# Patient Record
Sex: MALE | Race: BLACK OR AFRICAN AMERICAN | URBAN - METROPOLITAN AREA
[De-identification: names, ages, dates, MRNs, and addresses within clinical notes are randomized per-mention and may not be internally consistent; named-entity substitution may affect disease eponyms.]

---

## 2024-03-11 ENCOUNTER — NURSING HOME VISIT (OUTPATIENT)
Dept: POST ACUTE CARE | Facility: EXTERNAL LOCATION | Age: 64
End: 2024-03-11
Payer: MEDICARE

## 2024-03-11 DIAGNOSIS — R53.1 WEAKNESS: ICD-10-CM

## 2024-03-11 DIAGNOSIS — K21.9 GASTROESOPHAGEAL REFLUX DISEASE, UNSPECIFIED WHETHER ESOPHAGITIS PRESENT: ICD-10-CM

## 2024-03-11 DIAGNOSIS — F20.0 PARANOID SCHIZOPHRENIA (MULTI): ICD-10-CM

## 2024-03-11 DIAGNOSIS — R56.9 SEIZURES (MULTI): ICD-10-CM

## 2024-03-11 DIAGNOSIS — F03.90 DEMENTIA, UNSPECIFIED DEMENTIA SEVERITY, UNSPECIFIED DEMENTIA TYPE, UNSPECIFIED WHETHER BEHAVIORAL, PSYCHOTIC, OR MOOD DISTURBANCE OR ANXIETY (MULTI): ICD-10-CM

## 2024-03-11 PROBLEM — R44.0 AUDITORY HALLUCINATIONS: Status: ACTIVE | Noted: 2024-03-11

## 2024-03-11 PROBLEM — E78.5 BORDERLINE HYPERLIPIDEMIA: Status: ACTIVE | Noted: 2024-03-11

## 2024-03-11 PROBLEM — F20.9 SCHIZOPHRENIA (MULTI): Status: ACTIVE | Noted: 2024-03-11

## 2024-03-11 PROBLEM — R44.1 VISUAL HALLUCINATIONS: Status: ACTIVE | Noted: 2024-03-11

## 2024-03-11 PROBLEM — E55.9 VITAMIN D DEFICIENCY: Status: ACTIVE | Noted: 2024-03-11

## 2024-03-11 PROCEDURE — 99308 SBSQ NF CARE LOW MDM 20: CPT | Performed by: NURSE PRACTITIONER

## 2024-03-11 NOTE — PROGRESS NOTES
PROGRESS NOTE    Subjective   Chief complaint: Adalberto Damian is a 64 y.o. male who is an acute skilled patient being seen and evaluated for weakness    HPI:  Patient admitted to SNF for therapy d/t weakness after recent hospitalization.  Patient requires assist with ADLs and transfers. He has been evaluated by therapies and will be working with PT\OT\ST.  No new complaints.        Objective   Vital signs: 148/87,102,97%    Physical Exam  Constitutional:       General: He is not in acute distress.  Eyes:      Extraocular Movements: Extraocular movements intact.   Cardiovascular:      Rate and Rhythm: Normal rate and regular rhythm.   Pulmonary:      Effort: Pulmonary effort is normal.      Breath sounds: Normal breath sounds.   Abdominal:      General: Bowel sounds are normal.      Palpations: Abdomen is soft.   Musculoskeletal:      Cervical back: Neck supple.      Right lower leg: Edema present.      Left lower leg: Edema present.   Neurological:      Mental Status: He is alert.   Psychiatric:         Mood and Affect: Mood normal.         Behavior: Behavior is cooperative.         Assessment/Plan   Problem List Items Addressed This Visit       Gastroesophageal reflux disease     Omeprazole  Symptoms controlled  Monitor          Paranoid schizophrenia (CMS/[facility])     Risperidone  Controlled, monitor for changes          Dementia (CMS/[facility])     Memantine  Mentation at baseline  Monitor for changes          Seizures (CMS/[facility])     Depakote  Topiramate  Denies seizure activity  Monitor          Weakness     Continue working with therapy           Medications, treatments, and labs reviewed  Continue medications and treatments as listed in EMR    Scribe Attestation  I, Josefina Schaeffer   attest that this documentation has been prepared under the direction and in the presence of TALI Sylvester.     Provider Attestation - Scribe documentation  All medical record entries made by the Scribe were at my  direction and personally dictated by me. I have reviewed the chart and agree that the record accurately reflects my personal performance of the history, physical exam, discussion and plan.   Arely Streeter, APRN-CNP

## 2024-03-11 NOTE — LETTER
Patient: Adalberto Damian  : 1960    Encounter Date: 2024    PROGRESS NOTE    Subjective  Chief complaint: Adalberto Damian is a 64 y.o. male who is an acute skilled patient being seen and evaluated for weakness    HPI:  Patient admitted to SNF for therapy d/t weakness after recent hospitalization.  Patient requires assist with ADLs and transfers. He has been evaluated by therapies and will be working with PT\OT\ST.  No new complaints.        Objective  Vital signs: 148/87,102,97%    Physical Exam  Constitutional:       General: He is not in acute distress.  Eyes:      Extraocular Movements: Extraocular movements intact.   Cardiovascular:      Rate and Rhythm: Normal rate and regular rhythm.   Pulmonary:      Effort: Pulmonary effort is normal.      Breath sounds: Normal breath sounds.   Abdominal:      General: Bowel sounds are normal.      Palpations: Abdomen is soft.   Musculoskeletal:      Cervical back: Neck supple.      Right lower leg: Edema present.      Left lower leg: Edema present.   Neurological:      Mental Status: He is alert.   Psychiatric:         Mood and Affect: Mood normal.         Behavior: Behavior is cooperative.         Assessment/Plan  Problem List Items Addressed This Visit       Gastroesophageal reflux disease     Omeprazole  Symptoms controlled  Monitor          Paranoid schizophrenia (CMS/HCC)     Risperidone  Controlled, monitor for changes          Dementia (CMS/HCC)     Memantine  Mentation at baseline  Monitor for changes          Seizures (CMS/HCC)     Depakote  Topiramate  Denies seizure activity  Monitor          Weakness     Continue working with therapy           Medications, treatments, and labs reviewed  Continue medications and treatments as listed in EMR    Scribe Attestation  I, Josefina Schaeffer   attest that this documentation has been prepared under the direction and in the presence of TALI Sylvester.     Provider Attestation - Scribe  documentation  All medical record entries made by the Scribe were at my direction and personally dictated by me. I have reviewed the chart and agree that the record accurately reflects my personal performance of the history, physical exam, discussion and plan.   TALI Sylvester      Electronically Signed By: TALI Sylvester   3/17/24  2:48 PM

## 2024-03-22 ENCOUNTER — NURSING HOME VISIT (OUTPATIENT)
Dept: POST ACUTE CARE | Facility: EXTERNAL LOCATION | Age: 64
End: 2024-03-22
Payer: MEDICARE

## 2024-03-22 DIAGNOSIS — F03.90 DEMENTIA, UNSPECIFIED DEMENTIA SEVERITY, UNSPECIFIED DEMENTIA TYPE, UNSPECIFIED WHETHER BEHAVIORAL, PSYCHOTIC, OR MOOD DISTURBANCE OR ANXIETY (MULTI): ICD-10-CM

## 2024-03-22 DIAGNOSIS — F20.9 SCHIZOPHRENIA, UNSPECIFIED TYPE (MULTI): ICD-10-CM

## 2024-03-22 DIAGNOSIS — N39.0 URINARY TRACT INFECTION WITHOUT HEMATURIA, SITE UNSPECIFIED: ICD-10-CM

## 2024-03-22 DIAGNOSIS — N17.9 AKI (ACUTE KIDNEY INJURY) (CMS-HCC): ICD-10-CM

## 2024-03-22 DIAGNOSIS — E78.5 BORDERLINE HYPERLIPIDEMIA: ICD-10-CM

## 2024-03-22 DIAGNOSIS — I10 HYPERTENSION, UNSPECIFIED TYPE: ICD-10-CM

## 2024-03-22 DIAGNOSIS — K21.9 GASTROESOPHAGEAL REFLUX DISEASE, UNSPECIFIED WHETHER ESOPHAGITIS PRESENT: ICD-10-CM

## 2024-03-22 DIAGNOSIS — T82.41XD: Primary | ICD-10-CM

## 2024-03-22 PROBLEM — R13.10 DYSPHAGIA: Status: ACTIVE | Noted: 2024-03-22

## 2024-03-22 PROBLEM — T82.41XA: Status: ACTIVE | Noted: 2024-03-22

## 2024-03-22 PROCEDURE — 99305 1ST NF CARE MODERATE MDM 35: CPT | Performed by: INTERNAL MEDICINE

## 2024-03-22 NOTE — PROGRESS NOTES
HISTORY & PHYSICAL    Subjective   Chief complaint: Adalberto Damian is a 64 y.o. male who is being seen and evaluated for multiple medical problems.      HPI:  HPI  Patient had a previous admission in March with rhabdomyolysis and JEREMY, requiring HD. Patient had presented back to the hospital with permanent catheter malfunction.  Patient reported to have fevers and source unclear, urine culture on 3/15/2024, UTI, difficult to evaluate UA as on HD.  Patient reported to have sterile blood cultures.  Patient had recent COVID in February 2024 and was swabbed again and negative.  Patient is dialysis catheter was exchanged.  Patient tolerating dialysis without issue.  Patient was hemodynamically stable to discharge to SNF for continued management and therapy.  Patient was seen and examined at bedside, appears to be in no acute distress.  Denies chest pain or shortness of breath.  Denies nausea or vomiting.    Past Medical History:   Diagnosis Date    JEREMY (acute kidney injury) (CMS/HCC)     Dysphagia     GERD (gastroesophageal reflux disease)     HLD (hyperlipidemia)     HTN (hypertension)     Schizophrenia (CMS/[facility])     UTI (urinary tract infection)        No past surgical history on file.    Family History   Problem Relation Name Age of Onset    No Known Problems Mother      No Known Problems Father         Social History     Socioeconomic History    Marital status: Not on file     Spouse name: Not on file    Number of children: Not on file    Years of education: Not on file    Highest education level: Not on file   Occupational History    Not on file   Tobacco Use    Smoking status: Not on file    Smokeless tobacco: Not on file   Substance and Sexual Activity    Alcohol use: Not on file    Drug use: Not on file    Sexual activity: Not on file   Other Topics Concern    Not on file   Social History Narrative    Not on file     Social Determinants of Health     Financial Resource Strain: Not on file   Food Insecurity: Not on  file   Transportation Needs: Not on file   Physical Activity: Not on file   Stress: Not on file   Social Connections: Not on file   Intimate Partner Violence: Not on file   Housing Stability: Not on file       Vital signs: 134/84, 98.2, 101, 18, 98%    Objective   Physical Exam  Constitutional:       General: He is not in acute distress.  Eyes:      Extraocular Movements: Extraocular movements intact.   Cardiovascular:      Rate and Rhythm: Normal rate and regular rhythm.   Pulmonary:      Effort: Pulmonary effort is normal.      Breath sounds: Normal breath sounds.   Abdominal:      General: Bowel sounds are normal.      Palpations: Abdomen is soft.   Musculoskeletal:      Cervical back: Neck supple.      Right lower leg: Edema present.      Left lower leg: Edema present.   Neurological:      Mental Status: He is alert.   Psychiatric:         Mood and Affect: Mood normal.         Behavior: Behavior is cooperative.         Assessment/Plan   Problem List Items Addressed This Visit       Borderline hyperlipidemia     Statin  Monitor labs         GERD (gastroesophageal reflux disease)     Omeprazole  Symptoms controlled  Monitor GI symptoms           Schizophrenia (CMS/[facility])     Risperidone  Depakote  Monitor mood and behaviors           Dementia (CMS/[facility])     Memantine  Mentation at baseline  Monitor for changes          JEREMY (acute kidney injury) (CMS/[facility])     Continue to monitor labs  Stable on recent admission         UTI (urinary tract infection)     Continue Cipro until complete   Continue Amoxicillin until complete         Hemodialysis catheter malfunction (CMS/[facility]) - Primary     S/P exchange of HD catheter          HTN (hypertension)     Monitor BP          Hospital records reviewed  Medications, treatments, and labs reviewed  Continue medications and treatments as listed in EMR  Discussed with nursing and therapy      Scribe Attestation  I, Beatriz Cline, Scribe   attest that this documentation has been  prepared under the direction and in the presence of Francisca Moreno MD    Provider Attestation - Scribe documentation  All medical record entries made by the Scribe were at my direction and personally dictated by me. I have reviewed the chart and agree that the record accurately reflects my personal performance of the history, physical exam, discussion and plan.   Francisca Moreno MD

## 2024-03-22 NOTE — LETTER
Patient: Adalberto Damian  : 1960    Encounter Date: 2024    HISTORY & PHYSICAL    Subjective  Chief complaint: Adalberto Damian is a 64 y.o. male who is being seen and evaluated for multiple medical problems.      HPI:  HPI  Patient had a previous admission in March with rhabdomyolysis and JEREMY, requiring HD. Patient had presented back to the hospital with permanent catheter malfunction.  Patient reported to have fevers and source unclear, urine culture on 3/15/2024, UTI, difficult to evaluate UA as on HD.  Patient reported to have sterile blood cultures.  Patient had recent COVID in 2024 and was swabbed again and negative.  Patient is dialysis catheter was exchanged.  Patient tolerating dialysis without issue.  Patient was hemodynamically stable to discharge to SNF for continued management and therapy.  Patient was seen and examined at bedside, appears to be in no acute distress.  Denies chest pain or shortness of breath.  Denies nausea or vomiting.    Past Medical History:   Diagnosis Date   • JEREMY (acute kidney injury) (CMS/[facility])    • Dysphagia    • GERD (gastroesophageal reflux disease)    • HLD (hyperlipidemia)    • HTN (hypertension)    • Schizophrenia (CMS/HCC)    • UTI (urinary tract infection)        No past surgical history on file.    Family History   Problem Relation Name Age of Onset   • No Known Problems Mother     • No Known Problems Father         Social History     Socioeconomic History   • Marital status: Not on file     Spouse name: Not on file   • Number of children: Not on file   • Years of education: Not on file   • Highest education level: Not on file   Occupational History   • Not on file   Tobacco Use   • Smoking status: Not on file   • Smokeless tobacco: Not on file   Substance and Sexual Activity   • Alcohol use: Not on file   • Drug use: Not on file   • Sexual activity: Not on file   Other Topics Concern   • Not on file   Social History Narrative   • Not on file     Social  Determinants of Health     Financial Resource Strain: Not on file   Food Insecurity: Not on file   Transportation Needs: Not on file   Physical Activity: Not on file   Stress: Not on file   Social Connections: Not on file   Intimate Partner Violence: Not on file   Housing Stability: Not on file       Vital signs: 134/84, 98.2, 101, 18, 98%    Objective  Physical Exam  Constitutional:       General: He is not in acute distress.  Eyes:      Extraocular Movements: Extraocular movements intact.   Cardiovascular:      Rate and Rhythm: Normal rate and regular rhythm.   Pulmonary:      Effort: Pulmonary effort is normal.      Breath sounds: Normal breath sounds.   Abdominal:      General: Bowel sounds are normal.      Palpations: Abdomen is soft.   Musculoskeletal:      Cervical back: Neck supple.      Right lower leg: Edema present.      Left lower leg: Edema present.   Neurological:      Mental Status: He is alert.   Psychiatric:         Mood and Affect: Mood normal.         Behavior: Behavior is cooperative.         Assessment/Plan  Problem List Items Addressed This Visit       Borderline hyperlipidemia     Statin  Monitor labs         GERD (gastroesophageal reflux disease)     Omeprazole  Symptoms controlled  Monitor GI symptoms           Schizophrenia (CMS/[facility])     Risperidone  Depakote  Monitor mood and behaviors           Dementia (CMS/[facility])     Memantine  Mentation at baseline  Monitor for changes          JEREMY (acute kidney injury) (CMS/[facility])     Continue to monitor labs  Stable on recent admission         UTI (urinary tract infection)     Continue Cipro until complete   Continue Amoxicillin until complete         Hemodialysis catheter malfunction (CMS/[facility]) - Primary     S/P exchange of HD catheter          HTN (hypertension)     Monitor BP          Hospital records reviewed  Medications, treatments, and labs reviewed  Continue medications and treatments as listed in EMR  Discussed with nursing and  therapy      Scribe Attestation  I, Josefina Hdez   attest that this documentation has been prepared under the direction and in the presence of Francisca Moreno MD    Provider Attestation - Scribe documentation  All medical record entries made by the Scribe were at my direction and personally dictated by me. I have reviewed the chart and agree that the record accurately reflects my personal performance of the history, physical exam, discussion and plan.   Francisca Moreno MD      Electronically Signed By: Francisca Moreno MD   3/22/24  3:05 PM

## 2024-03-23 ENCOUNTER — LAB REQUISITION (OUTPATIENT)
Dept: LAB | Facility: HOSPITAL | Age: 64
End: 2024-03-23
Payer: MEDICARE

## 2024-03-23 DIAGNOSIS — N17.9 ACUTE KIDNEY FAILURE, UNSPECIFIED (CMS-HCC): ICD-10-CM

## 2024-03-23 LAB
ANION GAP SERPL CALC-SCNC: 16 MMOL/L (ref 10–20)
BUN SERPL-MCNC: 46 MG/DL (ref 6–23)
CALCIUM SERPL-MCNC: 11.8 MG/DL (ref 8.6–10.3)
CHLORIDE SERPL-SCNC: 98 MMOL/L (ref 98–107)
CO2 SERPL-SCNC: 26 MMOL/L (ref 21–32)
CREAT SERPL-MCNC: 2.66 MG/DL (ref 0.5–1.3)
EGFRCR SERPLBLD CKD-EPI 2021: 26 ML/MIN/1.73M*2
GLUCOSE SERPL-MCNC: 82 MG/DL (ref 74–99)
POTASSIUM SERPL-SCNC: 3.8 MMOL/L (ref 3.5–5.3)
SODIUM SERPL-SCNC: 136 MMOL/L (ref 136–145)

## 2024-03-23 PROCEDURE — 80048 BASIC METABOLIC PNL TOTAL CA: CPT

## 2024-03-25 ENCOUNTER — NURSING HOME VISIT (OUTPATIENT)
Dept: POST ACUTE CARE | Facility: EXTERNAL LOCATION | Age: 64
End: 2024-03-25
Payer: MEDICARE

## 2024-03-25 DIAGNOSIS — F03.90 DEMENTIA, UNSPECIFIED DEMENTIA SEVERITY, UNSPECIFIED DEMENTIA TYPE, UNSPECIFIED WHETHER BEHAVIORAL, PSYCHOTIC, OR MOOD DISTURBANCE OR ANXIETY (MULTI): ICD-10-CM
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
## 2024-03-25 DIAGNOSIS — R53.1 WEAKNESS: Primary | ICD-10-CM

## 2024-03-25 DIAGNOSIS — I10 HYPERTENSION, UNSPECIFIED TYPE: ICD-10-CM

## 2024-03-25 DIAGNOSIS — R56.9 SEIZURES (MULTI): ICD-10-CM

## 2024-03-25 PROCEDURE — 99308 SBSQ NF CARE LOW MDM 20: CPT | Performed by: NURSE PRACTITIONER

## 2024-03-25 NOTE — LETTER
Patient: Adalberto Damian  : 1960    Encounter Date: 2024    PROGRESS NOTE    Subjective  Chief complaint: Adalberto Damian is a 64 y.o. male who is an acute skilled patient being seen and evaluated for weakness    HPI:  HPI  Therapy has establish Care and goals with patient.  Patient is working on AROM and AA ROM to increase strength and endurance and improve functional mobility.  Reported by therapy that patient demonstrates difficulty staying on task.  Nursing called on 3.  Reported patient had a fall.  Denies pain.  No apparent injury.  Mentation at baseline.    Objective  Vital signs: 119/75, 98.2, 72, 18, 99%    Physical Exam  Constitutional:       General: He is not in acute distress.  Eyes:      Extraocular Movements: Extraocular movements intact.   Cardiovascular:      Rate and Rhythm: Normal rate and regular rhythm.   Pulmonary:      Effort: Pulmonary effort is normal.      Breath sounds: Normal breath sounds.      Comments: O2  Abdominal:      General: Bowel sounds are normal.      Palpations: Abdomen is soft.   Genitourinary:     Comments: Rey  Musculoskeletal:      Cervical back: Neck supple.      Right lower leg: Edema present.      Left lower leg: Edema present.      Comments: No pain with range of motion   Neurological:      Mental Status: He is alert.   Psychiatric:         Mood and Affect: Mood normal.         Behavior: Behavior is cooperative.         Assessment/Plan  Problem List Items Addressed This Visit       Dementia (CMS/[facility])     Memantine  Mentation at baseline  Monitor for changes          Seizures (CMS/[facility])     Depakote  Topiramate  Denies seizure activity  Monitor for seizure activity         Weakness - Primary     Continue with therapy         HTN (hypertension)     Monitor BP; currently controlled          Fall     No apparent injury          Medications, treatments, and labs reviewed  Continue medications and treatments as listed in EMR      Beatriz Sosa  Josefina Cline   attest that this documentation has been prepared under the direction and in the presence of TALI Sylvester    Provider Attestation - Scribe documentation  All medical record entries made by the Scribe were at my direction and personally dictated by me. I have reviewed the chart and agree that the record accurately reflects my personal performance of the history, physical exam, discussion and plan.   TALI Sylvester        Electronically Signed By: TALI Sylvester   3/28/24  8:34 PM

## 2024-03-26 ENCOUNTER — NURSING HOME VISIT (OUTPATIENT)
Dept: POST ACUTE CARE | Facility: EXTERNAL LOCATION | Age: 64
End: 2024-03-26
Payer: MEDICARE

## 2024-03-26 DIAGNOSIS — K21.9 GASTROESOPHAGEAL REFLUX DISEASE, UNSPECIFIED WHETHER ESOPHAGITIS PRESENT: ICD-10-CM

## 2024-03-26 DIAGNOSIS — R56.9 SEIZURES (MULTI): ICD-10-CM

## 2024-03-26 DIAGNOSIS — R53.1 WEAKNESS: ICD-10-CM

## 2024-03-26 DIAGNOSIS — I10 HYPERTENSION, UNSPECIFIED TYPE: ICD-10-CM

## 2024-03-26 DIAGNOSIS — F20.0 PARANOID SCHIZOPHRENIA (MULTI): ICD-10-CM

## 2024-03-26 DIAGNOSIS — F03.90 DEMENTIA, UNSPECIFIED DEMENTIA SEVERITY, UNSPECIFIED DEMENTIA TYPE, UNSPECIFIED WHETHER BEHAVIORAL, PSYCHOTIC, OR MOOD DISTURBANCE OR ANXIETY (MULTI): ICD-10-CM

## 2024-03-26 PROCEDURE — 99309 SBSQ NF CARE MODERATE MDM 30: CPT | Performed by: INTERNAL MEDICINE

## 2024-03-26 NOTE — LETTER
Patient: Adalberto Damian  : 1960    Encounter Date: 2024    PROGRESS NOTE    Subjective  Chief complaint: Adalberto Damian is a 64 y.o. male who is an acute skilled patient being seen and evaluated for weakness    HPI:  Patient admitted to SNF for therapy d/t weakness after recent hospitalization. Patient requires assist with ADLs and transfers. BUE strengthening to promite increased UB strength facilitating safety and increased IND. Pt req multiple vc and tactile cues to complete all activities this day. Continues working with ST to address swallowing technique. Pt demo decreased po intake at breakfast only agreeing to oatmeal with max A for feeding this date. He demo'd increased wet/gurgly vocal quality post swallow and delayed coughing post 5 minutes. Decreasing heprin to 5000 units BID. No new complaints.        Objective  Vital signs: 128/70,90,97%    Physical Exam  Constitutional:       General: He is not in acute distress.  Eyes:      Extraocular Movements: Extraocular movements intact.   Cardiovascular:      Rate and Rhythm: Normal rate and regular rhythm.   Pulmonary:      Effort: Pulmonary effort is normal.      Breath sounds: Normal breath sounds.   Abdominal:      General: Bowel sounds are normal.      Palpations: Abdomen is soft.   Musculoskeletal:      Cervical back: Neck supple.      Right lower leg: Edema present.      Left lower leg: Edema present.   Neurological:      Mental Status: He is alert.   Psychiatric:         Mood and Affect: Mood normal.         Behavior: Behavior is cooperative.         Assessment/Plan  Problem List Items Addressed This Visit       GERD (gastroesophageal reflux disease)     Omeprazole  Symptoms controlled  Monitor GI symptoms           Paranoid schizophrenia (CMS/HCC)     Risperidone  Controlled, monitor for changes          Dementia (CMS/HCC)     Memantine  Mentation at baseline  Monitor for changes          Seizures (CMS/[facility])     Depakote  Topiramate  Denies  seizure activity  Monitor for seizure activity         Weakness     Continue working with therapy          HTN (hypertension)     Monitor BP; currently controlled           Medications, treatments, and labs reviewed  Continue medications and treatments as listed in EMR    Scribe Attestation  I, Josefina Schaeffer   attest that this documentation has been prepared under the direction and in the presence of Francisca Moreno MD.     Provider Attestation - Scribe documentation  All medical record entries made by the Scribe were at my direction and personally dictated by me. I have reviewed the chart and agree that the record accurately reflects my personal performance of the history, physical exam, discussion and plan.   Francisca Moreno MD      Electronically Signed By: Francisca Moreno MD   3/26/24  4:00 PM

## 2024-03-26 NOTE — PROGRESS NOTES
PROGRESS NOTE    Subjective   Chief complaint: Adalberto Damian is a 64 y.o. male who is an acute skilled patient being seen and evaluated for weakness    HPI:  Patient admitted to SNF for therapy d/t weakness after recent hospitalization. Patient requires assist with ADLs and transfers. BUE strengthening to promite increased UB strength facilitating safety and increased IND. Pt req multiple vc and tactile cues to complete all activities this day. Continues working with ST to address swallowing technique. Pt demo decreased po intake at breakfast only agreeing to oatmeal with max A for feeding this date. He demo'd increased wet/gurgly vocal quality post swallow and delayed coughing post 5 minutes. Decreasing heprin to 5000 units BID. No new complaints.        Objective   Vital signs: 128/70,90,97%    Physical Exam  Constitutional:       General: He is not in acute distress.  Eyes:      Extraocular Movements: Extraocular movements intact.   Cardiovascular:      Rate and Rhythm: Normal rate and regular rhythm.   Pulmonary:      Effort: Pulmonary effort is normal.      Breath sounds: Normal breath sounds.   Abdominal:      General: Bowel sounds are normal.      Palpations: Abdomen is soft.   Musculoskeletal:      Cervical back: Neck supple.      Right lower leg: Edema present.      Left lower leg: Edema present.   Neurological:      Mental Status: He is alert.   Psychiatric:         Mood and Affect: Mood normal.         Behavior: Behavior is cooperative.         Assessment/Plan   Problem List Items Addressed This Visit       GERD (gastroesophageal reflux disease)     Omeprazole  Symptoms controlled  Monitor GI symptoms           Paranoid schizophrenia (CMS/HCC)     Risperidone  Controlled, monitor for changes          Dementia (CMS/HCC)     Memantine  Mentation at baseline  Monitor for changes          Seizures (CMS/HCC)     Depakote  Topiramate  Denies seizure activity  Monitor for seizure activity         Weakness      Continue working with therapy          HTN (hypertension)     Monitor BP; currently controlled           Medications, treatments, and labs reviewed  Continue medications and treatments as listed in EMR    Scribe Attestation  I, Josefina Schaeffer   attest that this documentation has been prepared under the direction and in the presence of Francisca Moreno MD.     Provider Attestation - Scribe documentation  All medical record entries made by the Scribe were at my direction and personally dictated by me. I have reviewed the chart and agree that the record accurately reflects my personal performance of the history, physical exam, discussion and plan.   Francisca Moreno MD

## 2024-03-27 ENCOUNTER — NURSING HOME VISIT (OUTPATIENT)
Dept: POST ACUTE CARE | Facility: EXTERNAL LOCATION | Age: 64
End: 2024-03-27
Payer: MEDICARE

## 2024-03-27 DIAGNOSIS — R53.1 WEAKNESS: Primary | ICD-10-CM

## 2024-03-27 DIAGNOSIS — I10 HYPERTENSION, UNSPECIFIED TYPE: ICD-10-CM

## 2024-03-27 DIAGNOSIS — F03.90 DEMENTIA, UNSPECIFIED DEMENTIA SEVERITY, UNSPECIFIED DEMENTIA TYPE, UNSPECIFIED WHETHER BEHAVIORAL, PSYCHOTIC, OR MOOD DISTURBANCE OR ANXIETY (MULTI): ICD-10-CM

## 2024-03-27 PROCEDURE — 99308 SBSQ NF CARE LOW MDM 20: CPT | Performed by: NURSE PRACTITIONER

## 2024-03-27 NOTE — LETTER
Patient: Adalberto Damian  : 1960    Encounter Date: 2024    PROGRESS NOTE    Subjective  Chief complaint: Adalberto Damian is a 64 y.o. male who is an acute skilled patient being seen and evaluated for weakness    HPI:  HPI  Patient does continue working in therapy working on therapeutic exercise to increase strength to improve functional mobility.  Patient is working on transfer training performing transfers requiring max assist x 2 to total dependence.  Patient seen and examined at bedside.  Reported that patient no longer on dialysis per nephrology.  Patient denies chest pain or shortness of breath.  Afebrile.    Objective  Vital signs: 114/68, 97.8, 86, 18, 95%    Physical Exam  Constitutional:       General: He is not in acute distress.  Cardiovascular:      Rate and Rhythm: Normal rate and regular rhythm.   Pulmonary:      Effort: Pulmonary effort is normal.      Breath sounds: Normal breath sounds.   Musculoskeletal:      Cervical back: Neck supple.      Right lower leg: Edema present.      Left lower leg: Edema present.   Neurological:      Mental Status: He is alert.      Motor: Weakness present.   Psychiatric:         Behavior: Behavior is cooperative.         Assessment/Plan  Problem List Items Addressed This Visit       Dementia (CMS/[facility])     Memantine  Mentation at baseline  Monitor for changes          HTN (hypertension)     Monitor BP; currently controlled          Weakness - Primary     Continue working in therapy          Medications, treatments, and labs reviewed  Continue medications and treatments as listed in EMR      Scribe Attestation  I, Josefina Hdez   attest that this documentation has been prepared under the direction and in the presence of ISMAEL Sylvester-CNP    Provider Attestation - Scribe documentation  All medical record entries made by the Scribe were at my direction and personally dictated by me. I have reviewed the chart and agree that the record accurately  reflects my personal performance of the history, physical exam, discussion and plan.   TALI Sylvester        Electronically Signed By: TALI Sylvester   3/28/24  9:11 PM

## 2024-03-28 ENCOUNTER — NURSING HOME VISIT (OUTPATIENT)
Dept: POST ACUTE CARE | Facility: EXTERNAL LOCATION | Age: 64
End: 2024-03-28
Payer: MEDICARE

## 2024-03-28 DIAGNOSIS — I10 HYPERTENSION, UNSPECIFIED TYPE: ICD-10-CM

## 2024-03-28 DIAGNOSIS — F03.90 DEMENTIA, UNSPECIFIED DEMENTIA SEVERITY, UNSPECIFIED DEMENTIA TYPE, UNSPECIFIED WHETHER BEHAVIORAL, PSYCHOTIC, OR MOOD DISTURBANCE OR ANXIETY (MULTI): ICD-10-CM

## 2024-03-28 DIAGNOSIS — E87.6 HYPOKALEMIA: ICD-10-CM

## 2024-03-28 DIAGNOSIS — K21.9 GASTROESOPHAGEAL REFLUX DISEASE, UNSPECIFIED WHETHER ESOPHAGITIS PRESENT: ICD-10-CM

## 2024-03-28 DIAGNOSIS — N18.9 CHRONIC KIDNEY DISEASE, UNSPECIFIED CKD STAGE: ICD-10-CM

## 2024-03-28 DIAGNOSIS — J18.9 PNEUMONIA DUE TO INFECTIOUS ORGANISM, UNSPECIFIED LATERALITY, UNSPECIFIED PART OF LUNG: ICD-10-CM

## 2024-03-28 DIAGNOSIS — R53.1 WEAKNESS: Primary | ICD-10-CM
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
## 2024-03-28 PROCEDURE — 99309 SBSQ NF CARE MODERATE MDM 30: CPT | Performed by: NURSE PRACTITIONER

## 2024-03-28 NOTE — LETTER
Patient: Adalberto Damian  : 1960    Encounter Date: 2024    PROGRESS NOTE    Subjective  Chief complaint: Adalberto Damian is a 64 y.o. male who is an acute skilled patient being seen and evaluated for weakness    HPI:  HPI  Patient does continue working in therapy due to generalized weakness.  Patient is working on bilateral lower extremity TherEX, unsupported at edge of bed requiring SBA to min assist, fluctuating with each activity.  Patient is performing transfers requiring SBA.  Patient seen and examined at bedside.  Nursing called yesterday reported that patient had labs that resulted as creatinine 2.9 up from 2.66, potassium 3.4, WBC of 12.5 and hemoglobin 8.5, was 7.9.  Orders were placed to send lab results to nephrology, potassium 20 mEq x 1, obtain chest x-ray and urine culture.  Patient's chest x-ray showed pneumonia.  Patient was started on Levaquin and Mucinex.  Patient tolerating antibiotics without adverse effects.  Afebrile.    Objective  Vital signs: 115/67, 97.7, 72, 18, 94%    Physical Exam  Constitutional:       General: He is not in acute distress.  Cardiovascular:      Rate and Rhythm: Normal rate and regular rhythm.   Pulmonary:      Effort: Pulmonary effort is normal.      Breath sounds: Decreased breath sounds present.   Musculoskeletal:      Cervical back: Neck supple.      Right lower leg: Edema present.      Left lower leg: Edema present.   Neurological:      Mental Status: He is alert.      Motor: Weakness present.   Psychiatric:         Behavior: Behavior is cooperative.         Assessment/Plan  Problem List Items Addressed This Visit       GERD (gastroesophageal reflux disease)     Omeprazole  Symptoms controlled  Monitor GI symptoms           Dementia (CMS/[facility])     Memantine  Mentation at baseline  Monitor for changes          Weakness - Primary     Continue working in therapy         HTN (hypertension)     Monitor BP;  BP at goal         Pneumonia     Continue antibiotic  until complete.  Mucinex  Monitor         CKD (chronic kidney disease)     Nephrology following         Hypokalemia     Potassium replaced  Monitor lab          Medications, treatments, and labs reviewed  Continue medications and treatments as listed in EMR      Scribe Attestation  I, Josefina Hdez   attest that this documentation has been prepared under the direction and in the presence of TALI Sylvester    Provider Attestation - Scribe documentation  All medical record entries made by the Scribe were at my direction and personally dictated by me. I have reviewed the chart and agree that the record accurately reflects my personal performance of the history, physical exam, discussion and plan.   TALI Sylvester        Electronically Signed By: TALI Sylvester   4/3/24  4:25 PM

## 2024-03-28 NOTE — PROGRESS NOTES
PROGRESS NOTE    Subjective   Chief complaint: Adalberto Damian is a 64 y.o. male who is an acute skilled patient being seen and evaluated for weakness    HPI:  HPI  Patient does continue working in therapy working on therapeutic exercise to increase strength to improve functional mobility.  Patient is working on transfer training performing transfers requiring max assist x 2 to total dependence.  Patient seen and examined at bedside.  Reported that patient no longer on dialysis per nephrology.  Patient denies chest pain or shortness of breath.  Afebrile.    Objective   Vital signs: 114/68, 97.8, 86, 18, 95%    Physical Exam  Constitutional:       General: He is not in acute distress.  Cardiovascular:      Rate and Rhythm: Normal rate and regular rhythm.   Pulmonary:      Effort: Pulmonary effort is normal.      Breath sounds: Normal breath sounds.   Musculoskeletal:      Cervical back: Neck supple.      Right lower leg: Edema present.      Left lower leg: Edema present.   Neurological:      Mental Status: He is alert.      Motor: Weakness present.   Psychiatric:         Behavior: Behavior is cooperative.         Assessment/Plan   Problem List Items Addressed This Visit       Dementia (CMS/[facility])     Memantine  Mentation at baseline  Monitor for changes          HTN (hypertension)     Monitor BP; currently controlled          Weakness - Primary     Continue working in therapy          Medications, treatments, and labs reviewed  Continue medications and treatments as listed in EMR      Scribe Attestation  Beatriz TORRES Scribe   attest that this documentation has been prepared under the direction and in the presence of ISMAEL Sylvester-CNP    Provider Attestation - Scribe documentation  All medical record entries made by the Scribe were at my direction and personally dictated by me. I have reviewed the chart and agree that the record accurately reflects my personal performance of the history, physical exam,  discussion and plan.   Arely Streeter, APRN-CNP

## 2024-03-28 NOTE — PROGRESS NOTES
PROGRESS NOTE    Subjective   Chief complaint: Adalberto Damian is a 64 y.o. male who is an acute skilled patient being seen and evaluated for weakness    HPI:  HPI  Therapy has establish Care and goals with patient.  Patient is working on AROM and AA ROM to increase strength and endurance and improve functional mobility.  Reported by therapy that patient demonstrates difficulty staying on task.  Nursing called on 3\23.  Reported patient had a fall.  Denies pain.  No apparent injury.  Mentation at baseline.    Objective   Vital signs: 119/75, 98.2, 72, 18, 99%    Physical Exam  Constitutional:       General: He is not in acute distress.  Eyes:      Extraocular Movements: Extraocular movements intact.   Cardiovascular:      Rate and Rhythm: Normal rate and regular rhythm.   Pulmonary:      Effort: Pulmonary effort is normal.      Breath sounds: Normal breath sounds.      Comments: O2  Abdominal:      General: Bowel sounds are normal.      Palpations: Abdomen is soft.   Genitourinary:     Comments: Rey  Musculoskeletal:      Cervical back: Neck supple.      Right lower leg: Edema present.      Left lower leg: Edema present.      Comments: No pain with range of motion   Neurological:      Mental Status: He is alert.   Psychiatric:         Mood and Affect: Mood normal.         Behavior: Behavior is cooperative.         Assessment/Plan   Problem List Items Addressed This Visit       Dementia (CMS/[facility])     Memantine  Mentation at baseline  Monitor for changes          Seizures (CMS/[facility])     Depakote  Topiramate  Denies seizure activity  Monitor for seizure activity         Weakness - Primary     Continue with therapy         HTN (hypertension)     Monitor BP; currently controlled          Fall     No apparent injury          Medications, treatments, and labs reviewed  Continue medications and treatments as listed in EMR      Scribe Attestation  MELISSA, Josefina Hdez   attest that this documentation has been prepared  under the direction and in the presence of TALI Sylvester    Provider Attestation - Scribe documentation  All medical record entries made by the Scribe were at my direction and personally dictated by me. I have reviewed the chart and agree that the record accurately reflects my personal performance of the history, physical exam, discussion and plan.   TALI Sylvester

## 2024-03-29 ENCOUNTER — NURSING HOME VISIT (OUTPATIENT)
Dept: POST ACUTE CARE | Facility: EXTERNAL LOCATION | Age: 64
End: 2024-03-29
Payer: MEDICARE

## 2024-03-29 DIAGNOSIS — R53.1 WEAKNESS: Primary | ICD-10-CM

## 2024-03-29 DIAGNOSIS — F03.90 DEMENTIA, UNSPECIFIED DEMENTIA SEVERITY, UNSPECIFIED DEMENTIA TYPE, UNSPECIFIED WHETHER BEHAVIORAL, PSYCHOTIC, OR MOOD DISTURBANCE OR ANXIETY (MULTI): ICD-10-CM

## 2024-03-29 DIAGNOSIS — R56.9 SEIZURES (MULTI): ICD-10-CM

## 2024-03-29 DIAGNOSIS — J18.9 PNEUMONIA DUE TO INFECTIOUS ORGANISM, UNSPECIFIED LATERALITY, UNSPECIFIED PART OF LUNG: ICD-10-CM

## 2024-03-29 DIAGNOSIS — I10 HYPERTENSION, UNSPECIFIED TYPE: ICD-10-CM

## 2024-03-29 PROCEDURE — 99308 SBSQ NF CARE LOW MDM 20: CPT | Performed by: INTERNAL MEDICINE

## 2024-03-29 NOTE — PROGRESS NOTES
PROGRESS NOTE    Subjective   Chief complaint: Adalberto Damian is a 64 y.o. male who is an acute skilled patient being seen and evaluated for weakness    HPI:  HPI  Patient is working in therapy working on bilateral lower extremity TherEX seated to increase strength and range of motion improve transfers his will as prepare for gait training.  Patient does require max assist x 2 people for repositioning once in wheelchair.  Patient seen and examined at bedside, appears to be in no acute distress.  Patient diagnosed with pneumonia, started on antibiotic, tolerating without adverse effects.  Afebrile at this time.  Denies shortness of breath or chest pain.    Objective   Vital signs: 115/67, 97.7, 72, 18, 97%    Physical Exam  Constitutional:       General: He is not in acute distress.  Cardiovascular:      Rate and Rhythm: Normal rate and regular rhythm.   Pulmonary:      Effort: Pulmonary effort is normal.      Breath sounds: Normal breath sounds.   Musculoskeletal:      Cervical back: Neck supple.      Right lower leg: Edema present.      Left lower leg: Edema present.   Neurological:      Mental Status: He is alert.      Motor: Weakness present.   Psychiatric:         Behavior: Behavior is cooperative.         Assessment/Plan   Problem List Items Addressed This Visit       Dementia (CMS/[facility])     Memantine  Mentation at baseline  Monitor for changes          Seizures (CMS/[facility])     Depakote  Topiramate  Denies seizure activity  Monitor for seizure activity         Weakness - Primary     Continue therapy         HTN (hypertension)     Monitor BP;  BP at goal         Pneumonia     Continue antibiotic until complete.  Mucinex  Monitor          Medications, treatments, and labs reviewed  Continue medications and treatments as listed in EMR      Scribe Attestation  MELISSA, Josefina Hdez   attest that this documentation has been prepared under the direction and in the presence of Francisca Moreno MD    Provider  Attestation - Scribe documentation  All medical record entries made by the Scribe were at my direction and personally dictated by me. I have reviewed the chart and agree that the record accurately reflects my personal performance of the history, physical exam, discussion and plan.   Francisca Mroeno MD

## 2024-03-29 NOTE — LETTER
Patient: Adalberto Damian  : 1960    Encounter Date: 2024    PROGRESS NOTE    Subjective  Chief complaint: Adalberto Damian is a 64 y.o. male who is an acute skilled patient being seen and evaluated for weakness    HPI:  HPI  Patient is working in therapy working on bilateral lower extremity TherEX seated to increase strength and range of motion improve transfers his will as prepare for gait training.  Patient does require max assist x 2 people for repositioning once in wheelchair.  Patient seen and examined at bedside, appears to be in no acute distress.  Patient diagnosed with pneumonia, started on antibiotic, tolerating without adverse effects.  Afebrile at this time.  Denies shortness of breath or chest pain.    Objective  Vital signs: 115/67, 97.7, 72, 18, 97%    Physical Exam  Constitutional:       General: He is not in acute distress.  Cardiovascular:      Rate and Rhythm: Normal rate and regular rhythm.   Pulmonary:      Effort: Pulmonary effort is normal.      Breath sounds: Normal breath sounds.   Musculoskeletal:      Cervical back: Neck supple.      Right lower leg: Edema present.      Left lower leg: Edema present.   Neurological:      Mental Status: He is alert.      Motor: Weakness present.   Psychiatric:         Behavior: Behavior is cooperative.         Assessment/Plan  Problem List Items Addressed This Visit       Dementia (CMS/[facility])     Memantine  Mentation at baseline  Monitor for changes          Seizures (CMS/[facility])     Depakote  Topiramate  Denies seizure activity  Monitor for seizure activity         Weakness - Primary     Continue therapy         HTN (hypertension)     Monitor BP;  BP at goal         Pneumonia     Continue antibiotic until complete.  Mucinex  Monitor          Medications, treatments, and labs reviewed  Continue medications and treatments as listed in EMR      Scribe Attestation  I, Josefina Hdez   attest that this documentation has been prepared under the  direction and in the presence of Francisca Moreno MD    Provider Attestation - Scribe documentation  All medical record entries made by the Scribe were at my direction and personally dictated by me. I have reviewed the chart and agree that the record accurately reflects my personal performance of the history, physical exam, discussion and plan.   Francisca Moreno MD        Electronically Signed By: Francisca Moreno MD   3/29/24  6:33 PM

## 2024-04-01 ENCOUNTER — NURSING HOME VISIT (OUTPATIENT)
Dept: POST ACUTE CARE | Facility: EXTERNAL LOCATION | Age: 64
End: 2024-04-01
Payer: MEDICARE

## 2024-04-01 DIAGNOSIS — N18.9 CHRONIC KIDNEY DISEASE, UNSPECIFIED CKD STAGE: ICD-10-CM

## 2024-04-01 DIAGNOSIS — D64.9 ANEMIA, UNSPECIFIED TYPE: ICD-10-CM

## 2024-04-01 DIAGNOSIS — R53.1 WEAKNESS: Primary | ICD-10-CM

## 2024-04-01 DIAGNOSIS — I10 HYPERTENSION, UNSPECIFIED TYPE: ICD-10-CM
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
## 2024-04-01 DIAGNOSIS — J18.9 PNEUMONIA DUE TO INFECTIOUS ORGANISM, UNSPECIFIED LATERALITY, UNSPECIFIED PART OF LUNG: ICD-10-CM

## 2024-04-01 DIAGNOSIS — F03.90 DEMENTIA, UNSPECIFIED DEMENTIA SEVERITY, UNSPECIFIED DEMENTIA TYPE, UNSPECIFIED WHETHER BEHAVIORAL, PSYCHOTIC, OR MOOD DISTURBANCE OR ANXIETY (MULTI): ICD-10-CM

## 2024-04-01 PROCEDURE — 99309 SBSQ NF CARE MODERATE MDM 30: CPT | Performed by: NURSE PRACTITIONER

## 2024-04-01 NOTE — LETTER
Patient: Adalberto Damian  : 1960    Encounter Date: 2024    PROGRESS NOTE    Subjective  Chief complaint: Adalberto Damian is a 64 y.o. male who is an acute skilled patient being seen and evaluated for weakness    HPI:  HPI  Patient is working in therapy on static balance activities during sitting, strengthening activities to increase functional task performance.  Patient is working on gross motor coordination.  Patient was seen and examined at bedside, appears to be in no acute distress.  Patient was started on antibiotic due to pneumonia.  Patient tolerating without adverse effects.  Nursing called yesterday reported patient had a fall, is on heparin and fall was unwitnessed.  Orders were placed to send to ED and returned.    Objective  Vital signs: 128/70, 97.8, 92, 18, 94%    Physical Exam  Constitutional:       General: He is not in acute distress.  Cardiovascular:      Rate and Rhythm: Normal rate and regular rhythm.   Pulmonary:      Effort: Pulmonary effort is normal.      Breath sounds: Normal breath sounds.      Comments: O2  Musculoskeletal:      Cervical back: Neck supple.      Right lower leg: Edema present.      Left lower leg: Edema present.      Comments: No pain with range of motion   Neurological:      Mental Status: He is alert.      Motor: Weakness present.   Psychiatric:         Behavior: Behavior is cooperative.         Assessment/Plan  Problem List Items Addressed This Visit       Dementia (CMS/[facility])     Memantine  Mentation at baseline  Monitor for changes   Assist with ADLs         Weakness - Primary     Continue working in therapy         HTN (hypertension)     Monitor BP         Fall     No apparent injury         Pneumonia     Continue antibiotic until complete.  Mucinex  Monitor         CKD (chronic kidney disease)     Creatinine improved  Monitor lab         Anemia     Stable  Monitor lab          Medications, treatments, and labs reviewed  Continue medications and treatments as  listed in EMR      Scribe Attestation  I, Josefina Hdez   attest that this documentation has been prepared under the direction and in the presence of TALI Sylvester    Provider Attestation - Scribe documentation  All medical record entries made by the Scribe were at my direction and personally dictated by me. I have reviewed the chart and agree that the record accurately reflects my personal performance of the history, physical exam, discussion and plan.   TALI Sylvester        Electronically Signed By: TALI Sylvester   4/8/24  7:25 PM

## 2024-04-02 ENCOUNTER — NURSING HOME VISIT (OUTPATIENT)
Dept: POST ACUTE CARE | Facility: EXTERNAL LOCATION | Age: 64
End: 2024-04-02
Payer: MEDICARE

## 2024-04-02 DIAGNOSIS — K21.9 GASTROESOPHAGEAL REFLUX DISEASE, UNSPECIFIED WHETHER ESOPHAGITIS PRESENT: ICD-10-CM

## 2024-04-02 DIAGNOSIS — I10 HYPERTENSION, UNSPECIFIED TYPE: ICD-10-CM

## 2024-04-02 DIAGNOSIS — J18.9 PNEUMONIA DUE TO INFECTIOUS ORGANISM, UNSPECIFIED LATERALITY, UNSPECIFIED PART OF LUNG: ICD-10-CM

## 2024-04-02 DIAGNOSIS — R53.1 WEAKNESS: Primary | ICD-10-CM

## 2024-04-02 DIAGNOSIS — F03.90 DEMENTIA, UNSPECIFIED DEMENTIA SEVERITY, UNSPECIFIED DEMENTIA TYPE, UNSPECIFIED WHETHER BEHAVIORAL, PSYCHOTIC, OR MOOD DISTURBANCE OR ANXIETY (MULTI): ICD-10-CM

## 2024-04-02 PROBLEM — N18.9 CKD (CHRONIC KIDNEY DISEASE): Status: ACTIVE | Noted: 2024-04-02

## 2024-04-02 PROBLEM — E87.6 HYPOKALEMIA: Status: ACTIVE | Noted: 2024-04-02

## 2024-04-02 PROCEDURE — 99309 SBSQ NF CARE MODERATE MDM 30: CPT | Performed by: INTERNAL MEDICINE

## 2024-04-02 NOTE — PROGRESS NOTES
PROGRESS NOTE    Subjective   Chief complaint: Adalberto Damian is a 64 y.o. male who is an acute skilled patient being seen and evaluated for weakness    HPI:  HPI  Patient does continue working in therapy due to generalized weakness.  Patient is working on bilateral lower extremity TherEX, unsupported at edge of bed requiring SBA to min assist, fluctuating with each activity.  Patient is performing transfers requiring SBA.  Patient seen and examined at bedside.  Nursing called yesterday reported that patient had labs that resulted as creatinine 2.9 up from 2.66, potassium 3.4, WBC of 12.5 and hemoglobin 8.5, was 7.9.  Orders were placed to send lab results to nephrology, potassium 20 mEq x 1, obtain chest x-ray and urine culture.  Patient's chest x-ray showed pneumonia.  Patient was started on Levaquin and Mucinex.  Patient tolerating antibiotics without adverse effects.  Afebrile.    Objective   Vital signs: 115/67, 97.7, 72, 18, 94%    Physical Exam  Constitutional:       General: He is not in acute distress.  Cardiovascular:      Rate and Rhythm: Normal rate and regular rhythm.   Pulmonary:      Effort: Pulmonary effort is normal.      Breath sounds: Decreased breath sounds present.   Musculoskeletal:      Cervical back: Neck supple.      Right lower leg: Edema present.      Left lower leg: Edema present.   Neurological:      Mental Status: He is alert.      Motor: Weakness present.   Psychiatric:         Behavior: Behavior is cooperative.         Assessment/Plan   Problem List Items Addressed This Visit       GERD (gastroesophageal reflux disease)     Omeprazole  Symptoms controlled  Monitor GI symptoms           Dementia (CMS/[facility])     Memantine  Mentation at baseline  Monitor for changes          Weakness - Primary     Continue working in therapy         HTN (hypertension)     Monitor BP;  BP at goal         Pneumonia     Continue antibiotic until complete.  Mucinex  Monitor         CKD (chronic kidney disease)      Nephrology following         Hypokalemia     Potassium replaced  Monitor lab          Medications, treatments, and labs reviewed  Continue medications and treatments as listed in EMR      Scribe Attestation  I, Josefina Hdez   attest that this documentation has been prepared under the direction and in the presence of TALI Sylvester    Provider Attestation - Scribe documentation  All medical record entries made by the Scribe were at my direction and personally dictated by me. I have reviewed the chart and agree that the record accurately reflects my personal performance of the history, physical exam, discussion and plan.   TALI Sylvester

## 2024-04-02 NOTE — LETTER
Patient: Adalberto Damian  : 1960    Encounter Date: 2024    PROGRESS NOTE    Subjective  Chief complaint: Adalberto Damian is a 64 y.o. male who is an acute skilled patient being seen and evaluated for weakness    HPI:  HPI  Patient does continue working in therapy working on gross motor coordination, static balance activities during sitting and strengthening activities to increase functional task performance.  Patient is also seen in follow-up of pneumonia.  Patient was started on Augmentin, tolerating without adverse effects.  Patient was seen and examined at bedside, appears to be in no acute distress.  Afebrile.  Denies nausea or vomiting.    Objective  Vital signs: 130/62, 97.8, 70, 18, 95%    Physical Exam  Constitutional:       General: He is not in acute distress.  Cardiovascular:      Rate and Rhythm: Normal rate and regular rhythm.   Pulmonary:      Effort: Pulmonary effort is normal.      Breath sounds: Normal breath sounds.   Musculoskeletal:      Cervical back: Neck supple.      Right lower leg: Edema present.      Left lower leg: Edema present.   Neurological:      Mental Status: He is alert.      Motor: Weakness present.   Psychiatric:         Behavior: Behavior is cooperative.         Assessment/Plan  Problem List Items Addressed This Visit       GERD (gastroesophageal reflux disease)     Omeprazole  Symptoms controlled  Monitor GI symptoms         Dementia (CMS/[facility])     Memantine  Mentation at baseline  Monitor for changes          Weakness - Primary     Continue working towards goals in therapy         HTN (hypertension)     Monitor BP         Pneumonia     Continue antibiotic until complete.  Mucinex  Monitor          Medications, treatments, and labs reviewed  Continue medications and treatments as listed in EMR      Scribe Attestation  I, Josefina Hdez   attest that this documentation has been prepared under the direction and in the presence of Francisca Moreno MD    Provider  Attestation - Scribe documentation  All medical record entries made by the Scribe were at my direction and personally dictated by me. I have reviewed the chart and agree that the record accurately reflects my personal performance of the history, physical exam, discussion and plan.   Francisca Moreno MD        Electronically Signed By: Francisca Moreno MD   4/3/24  2:32 PM

## 2024-04-03 ENCOUNTER — NURSING HOME VISIT (OUTPATIENT)
Dept: POST ACUTE CARE | Facility: EXTERNAL LOCATION | Age: 64
End: 2024-04-03
Payer: MEDICARE

## 2024-04-03 DIAGNOSIS — J18.9 PNEUMONIA DUE TO INFECTIOUS ORGANISM, UNSPECIFIED LATERALITY, UNSPECIFIED PART OF LUNG: ICD-10-CM

## 2024-04-03 DIAGNOSIS — R53.1 WEAKNESS: Primary | ICD-10-CM

## 2024-04-03 DIAGNOSIS — E55.9 VITAMIN D DEFICIENCY: ICD-10-CM

## 2024-04-03 DIAGNOSIS — F41.9 ANXIETY: ICD-10-CM

## 2024-04-03 DIAGNOSIS — I10 HYPERTENSION, UNSPECIFIED TYPE: ICD-10-CM

## 2024-04-03 PROCEDURE — 99308 SBSQ NF CARE LOW MDM 20: CPT | Performed by: NURSE PRACTITIONER

## 2024-04-03 NOTE — LETTER
Patient: Adalberto Damian  : 1960    Encounter Date: 2024    PROGRESS NOTE    Subjective  Chief complaint: Adalberto Damian is a 64 y.o. male who is an acute skilled patient being seen and evaluated for weakness    HPI:  HPI  Patient is working in therapy on gross motor coordination and static balance activities during sitting.  Patient also working on strengthening activities to increase functional task performance.  Patient did have a vitamin D level obtained that resulted as 17.  Patient also seen in follow-up for pneumonia, started on Augmentin.  Denies shortness of breath.  Denies fever or chills.  Patient is restless and anxious, according to nursing.  Patient is tolerating antibiotic without adverse effects.    Objective  Vital signs: 130/62, 97.8, 85, 18, 95%    Physical Exam  Constitutional:       General: He is not in acute distress.  Cardiovascular:      Rate and Rhythm: Normal rate and regular rhythm.   Pulmonary:      Effort: Pulmonary effort is normal.      Breath sounds: Normal breath sounds.   Genitourinary:     Comments: Rey  Musculoskeletal:      Cervical back: Neck supple.      Right lower leg: No edema.   Neurological:      Mental Status: He is alert.      Motor: Weakness present.   Psychiatric:         Behavior: Behavior is cooperative.         Assessment/Plan  Problem List Items Addressed This Visit       Vitamin D deficiency     Start vitamin D 50,000 international units weekly x 12 weeks then repeat level         Weakness - Primary     Continue therapy         HTN (hypertension)     Monitor BP         Pneumonia     Continue antibiotic until complete.  Mucinex  Monitor         Anxiety     Psych consult          Medications, treatments, and labs reviewed  Continue medications and treatments as listed in EMR      Scribe Attestation  MELISSA, Beatriz Cline, Josefina   attest that this documentation has been prepared under the direction and in the presence of Arely Streeter,  APRN-CNP    Provider Attestation - Scribe documentation  All medical record entries made by the Scribe were at my direction and personally dictated by me. I have reviewed the chart and agree that the record accurately reflects my personal performance of the history, physical exam, discussion and plan.   TALI Sylvester        Electronically Signed By: TALI Sylvester   4/16/24  5:05 PM

## 2024-04-03 NOTE — PROGRESS NOTES
PROGRESS NOTE    Subjective   Chief complaint: Adalberto Damian is a 64 y.o. male who is an acute skilled patient being seen and evaluated for weakness    HPI:  HPI  Patient does continue working in therapy working on gross motor coordination, static balance activities during sitting and strengthening activities to increase functional task performance.  Patient is also seen in follow-up of pneumonia.  Patient was started on Augmentin, tolerating without adverse effects.  Patient was seen and examined at bedside, appears to be in no acute distress.  Afebrile.  Denies nausea or vomiting.    Objective   Vital signs: 130/62, 97.8, 70, 18, 95%    Physical Exam  Constitutional:       General: He is not in acute distress.  Cardiovascular:      Rate and Rhythm: Normal rate and regular rhythm.   Pulmonary:      Effort: Pulmonary effort is normal.      Breath sounds: Normal breath sounds.   Musculoskeletal:      Cervical back: Neck supple.      Right lower leg: Edema present.      Left lower leg: Edema present.   Neurological:      Mental Status: He is alert.      Motor: Weakness present.   Psychiatric:         Behavior: Behavior is cooperative.         Assessment/Plan   Problem List Items Addressed This Visit       GERD (gastroesophageal reflux disease)     Omeprazole  Symptoms controlled  Monitor GI symptoms         Dementia (CMS/[facility])     Memantine  Mentation at baseline  Monitor for changes          Weakness - Primary     Continue working towards goals in therapy         HTN (hypertension)     Monitor BP         Pneumonia     Continue antibiotic until complete.  Mucinex  Monitor          Medications, treatments, and labs reviewed  Continue medications and treatments as listed in EMR      Scribe Attestation  I, Josefina Hdez   attest that this documentation has been prepared under the direction and in the presence of Francisca Moreno MD    Provider Attestation - Scribe documentation  All medical record entries made by  the Scribe were at my direction and personally dictated by me. I have reviewed the chart and agree that the record accurately reflects my personal performance of the history, physical exam, discussion and plan.   Francisca Moreno MD

## 2024-04-04 ENCOUNTER — NURSING HOME VISIT (OUTPATIENT)
Dept: POST ACUTE CARE | Facility: EXTERNAL LOCATION | Age: 64
End: 2024-04-04
Payer: MEDICARE

## 2024-04-04 DIAGNOSIS — R53.1 WEAKNESS: Primary | ICD-10-CM

## 2024-04-04 DIAGNOSIS — R56.9 SEIZURES (MULTI): ICD-10-CM

## 2024-04-04 DIAGNOSIS — I10 HYPERTENSION, UNSPECIFIED TYPE: ICD-10-CM

## 2024-04-04 DIAGNOSIS — F03.90 DEMENTIA, UNSPECIFIED DEMENTIA SEVERITY, UNSPECIFIED DEMENTIA TYPE, UNSPECIFIED WHETHER BEHAVIORAL, PSYCHOTIC, OR MOOD DISTURBANCE OR ANXIETY (MULTI): ICD-10-CM

## 2024-04-04 PROCEDURE — 99308 SBSQ NF CARE LOW MDM 20: CPT | Performed by: NURSE PRACTITIONER

## 2024-04-04 NOTE — LETTER
Patient: Adalberto Damian  : 1960    Encounter Date: 2024    PROGRESS NOTE    Subjective  Chief complaint: Adalberto Damian is a 64 y.o. male who is an acute skilled patient being seen and evaluated for weakness    HPI:  HPI  Patient is working in therapy.  Patient is working on static balance activities during sitting and strengthening activities to increase functional task performance.  Patient was seen and examined at bedside, appears to be in no acute distress.  Denies chest pain or shortness of breath.  Afebrile.  Nursing staff voicing no new concerns at this time.    Objective  Vital signs: 138/82, 97.7, 78, 18, 94%    Physical Exam  Constitutional:       General: He is not in acute distress.  Cardiovascular:      Rate and Rhythm: Normal rate and regular rhythm.   Pulmonary:      Effort: Pulmonary effort is normal.      Breath sounds: Normal breath sounds.      Comments: O2  Genitourinary:     Comments: Krissy  Musculoskeletal:      Cervical back: Neck supple.      Right lower leg: Edema present.      Left lower leg: Edema present.   Neurological:      Mental Status: He is alert.      Motor: Weakness present.   Psychiatric:         Behavior: Behavior is cooperative.         Assessment/Plan  Problem List Items Addressed This Visit       Dementia (Multi)     Memantine  Mentation at baseline  Monitor for changes   Assist with ADLs         Seizures (Multi)     Depakote  Topiramate  Denies seizure activity  Monitor for seizure activity         Weakness - Primary     Continue in therapy         HTN (hypertension)     Monitor BP          Medications, treatments, and labs reviewed  Continue medications and treatments as listed in EMR      Scribe Attestation  Beatriz TORRES Scribe   attest that this documentation has been prepared under the direction and in the presence of ISMAEL Sylvester-CNP    Provider Attestation - Scribe documentation  All medical record entries made by the Scribe were at my  direction and personally dictated by me. I have reviewed the chart and agree that the record accurately reflects my personal performance of the history, physical exam, discussion and plan.   TALI Sylvester        Electronically Signed By: TALI Sylvester   4/17/24  8:54 PM

## 2024-04-05 ENCOUNTER — NURSING HOME VISIT (OUTPATIENT)
Dept: POST ACUTE CARE | Facility: EXTERNAL LOCATION | Age: 64
End: 2024-04-05
Payer: MEDICARE

## 2024-04-05 DIAGNOSIS — R56.9 SEIZURES (MULTI): ICD-10-CM

## 2024-04-05 DIAGNOSIS — R53.1 WEAKNESS: Primary | ICD-10-CM

## 2024-04-05 DIAGNOSIS — F03.90 DEMENTIA, UNSPECIFIED DEMENTIA SEVERITY, UNSPECIFIED DEMENTIA TYPE, UNSPECIFIED WHETHER BEHAVIORAL, PSYCHOTIC, OR MOOD DISTURBANCE OR ANXIETY (MULTI): ICD-10-CM

## 2024-04-05 DIAGNOSIS — K21.9 GASTROESOPHAGEAL REFLUX DISEASE, UNSPECIFIED WHETHER ESOPHAGITIS PRESENT: ICD-10-CM

## 2024-04-05 DIAGNOSIS — I10 HYPERTENSION, UNSPECIFIED TYPE: ICD-10-CM

## 2024-04-05 PROCEDURE — 99309 SBSQ NF CARE MODERATE MDM 30: CPT | Performed by: INTERNAL MEDICINE

## 2024-04-05 NOTE — PROGRESS NOTES
PROGRESS NOTE    Subjective   Chief complaint: Adalberto Damian is a 64 y.o. male who is an acute skilled patient being seen and evaluated for weakness    HPI:  HPI  Patient does continue working in therapy due to generalized weakness.  Patient is working on gross motor coordination and static balance activities during sitting.  Patient also working on therapeutic exercise to increase strength and endurance.  Patient reported to be extremely confused, patient does have mattress to floor and mats on floor due to fall risk.  Patient seen and examined at bedside, appears to be in no acute distress.    Objective   Vital signs: 138/82, 97.7, 78, 18, 94%    Physical Exam  Constitutional:       General: He is not in acute distress.  Cardiovascular:      Rate and Rhythm: Normal rate and regular rhythm.   Pulmonary:      Effort: Pulmonary effort is normal.      Breath sounds: Normal breath sounds.   Musculoskeletal:      Cervical back: Neck supple.      Right lower leg: Edema present.      Left lower leg: Edema present.   Neurological:      Mental Status: He is alert.      Motor: Weakness present.   Psychiatric:         Behavior: Behavior is cooperative.         Assessment/Plan   Problem List Items Addressed This Visit       GERD (gastroesophageal reflux disease)     Omeprazole  Symptoms controlled  Monitor GI symptoms         Dementia (CMS/[facility])     Memantine  Mentation at baseline  Monitor for changes          Seizures (CMS/[facility])     Depakote  Topiramate  Denies seizure activity  Monitor for seizure activity         Weakness - Primary     Continue therapy         HTN (hypertension)     Monitor BP          Medications, treatments, and labs reviewed  Continue medications and treatments as listed in EMR      Scribe Attestation  I, Beatriz Cline, Latriciaibjavi   attest that this documentation has been prepared under the direction and in the presence of Francisca Moreno MD    Provider Attestation - Scribe documentation  All medical record  entries made by the Scribe were at my direction and personally dictated by me. I have reviewed the chart and agree that the record accurately reflects my personal performance of the history, physical exam, discussion and plan.   Francisca Moreno MD

## 2024-04-05 NOTE — LETTER
Patient: Adalberto Damian  : 1960    Encounter Date: 2024    PROGRESS NOTE    Subjective  Chief complaint: Adalberto Damian is a 64 y.o. male who is an acute skilled patient being seen and evaluated for weakness    HPI:  HPI  Patient does continue working in therapy due to generalized weakness.  Patient is working on gross motor coordination and static balance activities during sitting.  Patient also working on therapeutic exercise to increase strength and endurance.  Patient reported to be extremely confused, patient does have mattress to floor and mats on floor due to fall risk.  Patient seen and examined at bedside, appears to be in no acute distress.    Objective  Vital signs: 138/82, 97.7, 78, 18, 94%    Physical Exam  Constitutional:       General: He is not in acute distress.  Cardiovascular:      Rate and Rhythm: Normal rate and regular rhythm.   Pulmonary:      Effort: Pulmonary effort is normal.      Breath sounds: Normal breath sounds.   Musculoskeletal:      Cervical back: Neck supple.      Right lower leg: Edema present.      Left lower leg: Edema present.   Neurological:      Mental Status: He is alert.      Motor: Weakness present.   Psychiatric:         Behavior: Behavior is cooperative.         Assessment/Plan  Problem List Items Addressed This Visit       GERD (gastroesophageal reflux disease)     Omeprazole  Symptoms controlled  Monitor GI symptoms         Dementia (CMS/HCC)     Memantine  Mentation at baseline  Monitor for changes          Seizures (CMS/[facility])     Depakote  Topiramate  Denies seizure activity  Monitor for seizure activity         Weakness - Primary     Continue therapy         HTN (hypertension)     Monitor BP          Medications, treatments, and labs reviewed  Continue medications and treatments as listed in EMR      Scribe Attestation  MELISSA, Beatriz Cline, Josefina   attest that this documentation has been prepared under the direction and in the presence of Francisca Moreno  MD    Provider Attestation - Scribe documentation  All medical record entries made by the Scribe were at my direction and personally dictated by me. I have reviewed the chart and agree that the record accurately reflects my personal performance of the history, physical exam, discussion and plan.   Francisca Moreno MD        Electronically Signed By: Francisca Moreno MD   4/5/24  3:47 PM

## 2024-04-08 ENCOUNTER — NURSING HOME VISIT (OUTPATIENT)
Dept: POST ACUTE CARE | Facility: EXTERNAL LOCATION | Age: 64
End: 2024-04-08
Payer: MEDICARE

## 2024-04-08 DIAGNOSIS — R53.1 WEAKNESS: Primary | ICD-10-CM

## 2024-04-08 DIAGNOSIS — D64.9 ANEMIA, UNSPECIFIED TYPE: ICD-10-CM

## 2024-04-08 DIAGNOSIS — N18.9 CHRONIC KIDNEY DISEASE, UNSPECIFIED CKD STAGE: ICD-10-CM

## 2024-04-08 DIAGNOSIS — F03.90 DEMENTIA, UNSPECIFIED DEMENTIA SEVERITY, UNSPECIFIED DEMENTIA TYPE, UNSPECIFIED WHETHER BEHAVIORAL, PSYCHOTIC, OR MOOD DISTURBANCE OR ANXIETY (MULTI): ICD-10-CM

## 2024-04-08 DIAGNOSIS — E87.6 HYPOKALEMIA: ICD-10-CM

## 2024-04-08 PROCEDURE — 99309 SBSQ NF CARE MODERATE MDM 30: CPT | Performed by: NURSE PRACTITIONER

## 2024-04-08 NOTE — PROGRESS NOTES
PROGRESS NOTE    Subjective   Chief complaint: Adalberto Damian is a 64 y.o. male who is an acute skilled patient being seen and evaluated for weakness    HPI:  HPI  Patient is working in therapy on static balance activities during sitting, strengthening activities to increase functional task performance.  Patient is working on gross motor coordination.  Patient was seen and examined at bedside, appears to be in no acute distress.  Patient was started on antibiotic due to pneumonia.  Patient tolerating without adverse effects.  Nursing called yesterday reported patient had a fall, is on heparin and fall was unwitnessed.  Orders were placed to send to ED and returned.    Objective   Vital signs: 128/70, 97.8, 92, 18, 94%    Physical Exam  Constitutional:       General: He is not in acute distress.  Cardiovascular:      Rate and Rhythm: Normal rate and regular rhythm.   Pulmonary:      Effort: Pulmonary effort is normal.      Breath sounds: Normal breath sounds.      Comments: O2  Musculoskeletal:      Cervical back: Neck supple.      Right lower leg: Edema present.      Left lower leg: Edema present.      Comments: No pain with range of motion   Neurological:      Mental Status: He is alert.      Motor: Weakness present.   Psychiatric:         Behavior: Behavior is cooperative.         Assessment/Plan   Problem List Items Addressed This Visit       Dementia (CMS/[facility])     Memantine  Mentation at baseline  Monitor for changes   Assist with ADLs         Weakness - Primary     Continue working in therapy         HTN (hypertension)     Monitor BP         Fall     No apparent injury         Pneumonia     Continue antibiotic until complete.  Mucinex  Monitor         CKD (chronic kidney disease)     Creatinine improved  Monitor lab         Anemia     Stable  Monitor lab          Medications, treatments, and labs reviewed  Continue medications and treatments as listed in EMR      Beatriz Sosa Scribe    attest that this documentation has been prepared under the direction and in the presence of TALI Sylvester    Provider Attestation - Scribe documentation  All medical record entries made by the Scribe were at my direction and personally dictated by me. I have reviewed the chart and agree that the record accurately reflects my personal performance of the history, physical exam, discussion and plan.   TALI Sylvester

## 2024-04-08 NOTE — LETTER
Patient: Adalberto Damian  : 1960    Encounter Date: 2024    PROGRESS NOTE    Subjective  Chief complaint: Adalberto Damian is a 64 y.o. male who is an acute skilled patient being seen and evaluated for weakness    HPI:  HPI  Patient does continue working in therapy.  Therapy is working on positioning and alignment on mattress for patient's comfort.  Patient seen and examined at bedside.  Patient also seen f/u due to nurse calling on 4\5 reported patient had a urine obtained that was negative, potassium reported to be 3.3, hemoglobin 8.9 (was 9.2), creatinine 1.3 (was 1.9.  Orders were placed for KCl 40 mEq x1 and repeat level in 1 week.    Objective  Vital signs: 114/72, 78, 18, 98.0, 94%    Physical Exam  Constitutional:       General: He is not in acute distress.  Cardiovascular:      Rate and Rhythm: Normal rate and regular rhythm.   Pulmonary:      Effort: Pulmonary effort is normal.      Breath sounds: Normal breath sounds.      Comments: O2  Genitourinary:     Comments: Rey  Musculoskeletal:      Cervical back: Neck supple.      Right lower leg: Edema present.      Left lower leg: Edema present.   Neurological:      Mental Status: He is alert.      Motor: Weakness present.   Psychiatric:         Behavior: Behavior is cooperative.         Assessment/Plan  Problem List Items Addressed This Visit       Dementia (Multi)     Memantine  Mentation at baseline  Monitor for changes   Assist with ADLs         Weakness - Primary     Continue in therapy         CKD (chronic kidney disease)     Renal function improving  Monitor lab         Hypokalemia     Potassium replaced  Monitor lab         Anemia     Stable  Monitor lab          Medications, treatments, and labs reviewed  Continue medications and treatments as listed in EMR      Scribe Attestation  MELISSA, Josefina Hdez   attest that this documentation has been prepared under the direction and in the presence of ISMAEL Sylvester-SHAKIRA    Provider  Attestation - Scribe documentation  All medical record entries made by the Scribe were at my direction and personally dictated by me. I have reviewed the chart and agree that the record accurately reflects my personal performance of the history, physical exam, discussion and plan.   TALI Sylvester        Electronically Signed By: TALI Sylvester   4/20/24  5:53 PM

## 2024-04-09 ENCOUNTER — NURSING HOME VISIT (OUTPATIENT)
Dept: POST ACUTE CARE | Facility: EXTERNAL LOCATION | Age: 64
End: 2024-04-09
Payer: MEDICARE

## 2024-04-09 DIAGNOSIS — F03.90 DEMENTIA, UNSPECIFIED DEMENTIA SEVERITY, UNSPECIFIED DEMENTIA TYPE, UNSPECIFIED WHETHER BEHAVIORAL, PSYCHOTIC, OR MOOD DISTURBANCE OR ANXIETY (MULTI): ICD-10-CM

## 2024-04-09 DIAGNOSIS — R56.9 SEIZURES (MULTI): ICD-10-CM

## 2024-04-09 DIAGNOSIS — K21.9 GASTROESOPHAGEAL REFLUX DISEASE, UNSPECIFIED WHETHER ESOPHAGITIS PRESENT: ICD-10-CM

## 2024-04-09 DIAGNOSIS — R53.1 WEAKNESS: ICD-10-CM

## 2024-04-09 DIAGNOSIS — I10 HYPERTENSION, UNSPECIFIED TYPE: ICD-10-CM

## 2024-04-09 DIAGNOSIS — F20.0 PARANOID SCHIZOPHRENIA (MULTI): ICD-10-CM

## 2024-04-09 PROCEDURE — 99308 SBSQ NF CARE LOW MDM 20: CPT | Performed by: INTERNAL MEDICINE

## 2024-04-09 NOTE — LETTER
Patient: Adalberto Damian  : 1960    Encounter Date: 2024    PROGRESS NOTE    Subjective  Chief complaint: Adalberto Damian is a 64 y.o. male who is an acute skilled patient being seen and evaluated for weakness    HPI:  Patient has been working in therapy to improve strength, endurance, and ADLs.  Patient continues to work toward goals. Pt sat edge of chair with good seated balance. STS using morgan railing for B UE support. Pt stood CGA at morgan railiing for up to 30 seconds to promote increased LE strength and standing balance for safety and ind in all functional tasks. pt able to  2ww ~30sec to 1 min. No new concerns today.  Denies n/v/f/c pain.        Objective  Vital signs: 119/68,92,96%    Physical Exam  Constitutional:       General: He is not in acute distress.  Cardiovascular:      Rate and Rhythm: Normal rate and regular rhythm.   Pulmonary:      Effort: Pulmonary effort is normal.      Breath sounds: Normal breath sounds.   Musculoskeletal:      Cervical back: Neck supple.      Right lower leg: Edema present.      Left lower leg: Edema present.   Neurological:      Mental Status: He is alert.      Motor: Weakness present.   Psychiatric:         Behavior: Behavior is cooperative.         Assessment/Plan  Problem List Items Addressed This Visit       GERD (gastroesophageal reflux disease)     Omeprazole  Symptoms controlled  Monitor GI symptoms         Paranoid schizophrenia (CMS/[facility])     Risperidone  Controlled, monitor for changes          Dementia (CMS/[facility])     Memantine  Mentation at baseline  Monitor for changes   Assist with ADLs         Seizures (CMS/[facility])     Depakote  Topiramate  Denies seizure activity  Monitor for seizure activity         Weakness     Continue working in therapy         HTN (hypertension)     Monitor BP; currently at goal          Medications, treatments, and labs reviewed  Continue medications and treatments as listed in EMR    Scribe Attestation  I, Nicole Duran,  Scribe   attest that this documentation has been prepared under the direction and in the presence of Francisca Moreno MD.     Provider Attestation - Scribe documentation  All medical record entries made by the Scribe were at my direction and personally dictated by me. I have reviewed the chart and agree that the record accurately reflects my personal performance of the history, physical exam, discussion and plan.   Francisca Moreno MD      Electronically Signed By: Francisca Moreno MD   4/9/24  3:00 PM

## 2024-04-09 NOTE — PROGRESS NOTES
PROGRESS NOTE    Subjective   Chief complaint: Adalberto Damian is a 64 y.o. male who is an acute skilled patient being seen and evaluated for weakness    HPI:  Patient has been working in therapy to improve strength, endurance, and ADLs.  Patient continues to work toward goals. Pt sat edge of chair with good seated balance. STS using morgan railing for B UE support. Pt stood CGA at morgan railiing for up to 30 seconds to promote increased LE strength and standing balance for safety and ind in all functional tasks. pt able to  2ww ~30sec to 1 min. No new concerns today.  Denies n/v/f/c pain.        Objective   Vital signs: 119/68,92,96%    Physical Exam  Constitutional:       General: He is not in acute distress.  Cardiovascular:      Rate and Rhythm: Normal rate and regular rhythm.   Pulmonary:      Effort: Pulmonary effort is normal.      Breath sounds: Normal breath sounds.   Musculoskeletal:      Cervical back: Neck supple.      Right lower leg: Edema present.      Left lower leg: Edema present.   Neurological:      Mental Status: He is alert.      Motor: Weakness present.   Psychiatric:         Behavior: Behavior is cooperative.         Assessment/Plan   Problem List Items Addressed This Visit       GERD (gastroesophageal reflux disease)     Omeprazole  Symptoms controlled  Monitor GI symptoms         Paranoid schizophrenia (CMS/H[facility])     Risperidone  Controlled, monitor for changes          Dementia (CMS/[facility])     Memantine  Mentation at baseline  Monitor for changes   Assist with ADLs         Seizures (CMS/[facility])     Depakote  Topiramate  Denies seizure activity  Monitor for seizure activity         Weakness     Continue working in therapy         HTN (hypertension)     Monitor BP; currently at goal          Medications, treatments, and labs reviewed  Continue medications and treatments as listed in EMR    Scribe Attestation  I, Josefina Schaeffer   attest that this documentation has been prepared under the  direction and in the presence of Francisca Moreno MD.     Provider Attestation - Scribe documentation  All medical record entries made by the Scribe were at my direction and personally dictated by me. I have reviewed the chart and agree that the record accurately reflects my personal performance of the history, physical exam, discussion and plan.   Francisca Moreno MD

## 2024-04-10 ENCOUNTER — NURSING HOME VISIT (OUTPATIENT)
Dept: POST ACUTE CARE | Facility: EXTERNAL LOCATION | Age: 64
End: 2024-04-10
Payer: MEDICARE

## 2024-04-10 DIAGNOSIS — R53.1 WEAKNESS: Primary | ICD-10-CM

## 2024-04-10 DIAGNOSIS — M54.50 LOW BACK PAIN, UNSPECIFIED BACK PAIN LATERALITY, UNSPECIFIED CHRONICITY, UNSPECIFIED WHETHER SCIATICA PRESENT: ICD-10-CM
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
## 2024-04-10 PROBLEM — M54.9 BACK PAIN: Status: ACTIVE | Noted: 2024-04-10

## 2024-04-10 PROCEDURE — 99308 SBSQ NF CARE LOW MDM 20: CPT | Performed by: NURSE PRACTITIONER

## 2024-04-10 NOTE — LETTER
Patient: Adalberto Damian  : 1960    Encounter Date: 04/10/2024    PROGRESS NOTE    Subjective  Chief complaint: Adalberto Damian is a 64 y.o. male who is an acute skilled patient being seen and evaluated for weakness    HPI:  HPI  Therapy has been working with patient on positioning and alignment.  Nursing staff reports that patient was noted to have hip pain during his therapy session today.  Patient denies hip pain and states that his back is hurting.  No new complaints otherwise.  Denies constitutional symptoms.    Objective    Physical Exam  Constitutional:       General: He is not in acute distress.  Cardiovascular:      Rate and Rhythm: Normal rate and regular rhythm.   Pulmonary:      Effort: Pulmonary effort is normal.      Breath sounds: Normal breath sounds.      Comments: O2  Musculoskeletal:      Cervical back: Neck supple.      Right lower leg: Edema present.      Left lower leg: Edema present.      Comments: No pain with range of motion of bilateral lower extremities  Good range of motion of bilateral lower extremities  Tenderness over the mid back   Neurological:      Mental Status: He is alert.      Motor: Weakness present.   Psychiatric:         Behavior: Behavior is cooperative.         Assessment/Plan  Problem List Items Addressed This Visit       Back pain     Start lidocaine patch         Weakness - Primary     Continue to work with therapy          Medications, treatments, and labs reviewed  Continue medications and treatments as listed in EMR    TALI Sylvester      Electronically Signed By: TALI Sylvester   4/10/24  5:14 PM

## 2024-04-10 NOTE — PROGRESS NOTES
PROGRESS NOTE    Subjective   Chief complaint: Adalberto Damian is a 64 y.o. male who is an acute skilled patient being seen and evaluated for weakness    HPI:  HPI  Therapy has been working with patient on positioning and alignment.  Nursing staff reports that patient was noted to have hip pain during his therapy session today.  Patient denies hip pain and states that his back is hurting.  No new complaints otherwise.  Denies constitutional symptoms.    Objective     Physical Exam  Constitutional:       General: He is not in acute distress.  Cardiovascular:      Rate and Rhythm: Normal rate and regular rhythm.   Pulmonary:      Effort: Pulmonary effort is normal.      Breath sounds: Normal breath sounds.      Comments: O2  Musculoskeletal:      Cervical back: Neck supple.      Right lower leg: Edema present.      Left lower leg: Edema present.      Comments: No pain with range of motion of bilateral lower extremities  Good range of motion of bilateral lower extremities  Tenderness over the mid back   Neurological:      Mental Status: He is alert.      Motor: Weakness present.   Psychiatric:         Behavior: Behavior is cooperative.         Assessment/Plan   Problem List Items Addressed This Visit       Back pain     Start lidocaine patch         Weakness - Primary     Continue to work with therapy          Medications, treatments, and labs reviewed  Continue medications and treatments as listed in EMR    Arely Streeter, APRN-CNP

## 2024-04-11 ENCOUNTER — NURSING HOME VISIT (OUTPATIENT)
Dept: POST ACUTE CARE | Facility: EXTERNAL LOCATION | Age: 64
End: 2024-04-11
Payer: MEDICARE

## 2024-04-11 DIAGNOSIS — I10 HYPERTENSION, UNSPECIFIED TYPE: ICD-10-CM

## 2024-04-11 DIAGNOSIS — F03.90 DEMENTIA, UNSPECIFIED DEMENTIA SEVERITY, UNSPECIFIED DEMENTIA TYPE, UNSPECIFIED WHETHER BEHAVIORAL, PSYCHOTIC, OR MOOD DISTURBANCE OR ANXIETY (MULTI): ICD-10-CM

## 2024-04-11 DIAGNOSIS — R53.1 WEAKNESS: Primary | ICD-10-CM

## 2024-04-11 PROCEDURE — 99308 SBSQ NF CARE LOW MDM 20: CPT | Performed by: NURSE PRACTITIONER

## 2024-04-11 NOTE — LETTER
Patient: Adalberto Damian  : 1960    Encounter Date: 2024    PROGRESS NOTE    Subjective  Chief complaint: Adalberto Damian is a 64 y.o. male who is an acute skilled patient being seen and evaluated for weakness    HPI:  HPI  Therapy continues to work with patient due to generalized weakness.  Therapy is working with patient on standing.  Patient also working on bed mobility for comfort.  Patient was seen and examined at bedside, appears to be in no acute distress.  Denies chest pain or shortness of breath.  Denies fever or chills.    Objective  Vital signs: 117/62, 18, 80, 94%    Physical Exam  Constitutional:       General: He is not in acute distress.  Cardiovascular:      Rate and Rhythm: Normal rate and regular rhythm.   Pulmonary:      Effort: Pulmonary effort is normal.      Breath sounds: Normal breath sounds.      Comments: O2  Genitourinary:     Comments: Krissy  Musculoskeletal:      Cervical back: Neck supple.      Right lower leg: Edema present.      Left lower leg: Edema present.   Neurological:      Mental Status: He is alert.      Motor: Weakness present.   Psychiatric:         Behavior: Behavior is cooperative.         Assessment/Plan  Problem List Items Addressed This Visit       Dementia (Multi)     Memantine  Mentation at baseline  Monitor for changes   Assist with ADLs         HTN (hypertension)     Blood pressure at goal  Monitor BP         Weakness - Primary     Continue in therapy          Medications, treatments, and labs reviewed  Continue medications and treatments as listed in EMR      Scribe Attestation  Beatriz TORRES Scribe   attest that this documentation has been prepared under the direction and in the presence of ISMAEL Sylvester-CNP    Provider Attestation - Scribe documentation  All medical record entries made by the Scribe were at my direction and personally dictated by me. I have reviewed the chart and agree that the record accurately reflects my personal  performance of the history, physical exam, discussion and plan.   TALI Sylvester        Electronically Signed By: TALI Sylvester   4/23/24  2:44 PM

## 2024-04-12 ENCOUNTER — NURSING HOME VISIT (OUTPATIENT)
Dept: POST ACUTE CARE | Facility: EXTERNAL LOCATION | Age: 64
End: 2024-04-12
Payer: MEDICARE

## 2024-04-12 DIAGNOSIS — R53.1 WEAKNESS: Primary | ICD-10-CM

## 2024-04-12 DIAGNOSIS — F03.90 DEMENTIA, UNSPECIFIED DEMENTIA SEVERITY, UNSPECIFIED DEMENTIA TYPE, UNSPECIFIED WHETHER BEHAVIORAL, PSYCHOTIC, OR MOOD DISTURBANCE OR ANXIETY (MULTI): ICD-10-CM

## 2024-04-12 DIAGNOSIS — I10 HYPERTENSION, UNSPECIFIED TYPE: ICD-10-CM

## 2024-04-12 DIAGNOSIS — R56.9 SEIZURES (MULTI): ICD-10-CM

## 2024-04-12 DIAGNOSIS — K21.9 GASTROESOPHAGEAL REFLUX DISEASE, UNSPECIFIED WHETHER ESOPHAGITIS PRESENT: ICD-10-CM

## 2024-04-12 PROCEDURE — 99308 SBSQ NF CARE LOW MDM 20: CPT | Performed by: INTERNAL MEDICINE

## 2024-04-12 NOTE — LETTER
Patient: Adalberto Damian  : 1960    Encounter Date: 2024    PROGRESS NOTE    Subjective  Chief complaint: Adalberto Damian is a 64 y.o. male who is an acute skilled patient being seen and evaluated for weakness    HPI:  HPI  Patient does continue in therapy.  Therapy is working on positioning and alignment on mattress for patient comfort.  Patient was seen and examined at bedside, appears to be in no acute distress.  Denies chest pain or shortness of breath.  Afebrile.  Nursing staff voicing no new concerns at this time.    Objective  Vital signs: 117/76, 80, 94%    Physical Exam  Constitutional:       General: He is not in acute distress.  Cardiovascular:      Rate and Rhythm: Normal rate and regular rhythm.   Pulmonary:      Effort: Pulmonary effort is normal.      Breath sounds: Normal breath sounds.   Musculoskeletal:      Cervical back: Neck supple.      Right lower leg: Edema present.      Left lower leg: Edema present.   Neurological:      Mental Status: He is alert.      Motor: Weakness present.   Psychiatric:         Behavior: Behavior is cooperative.         Assessment/Plan  Problem List Items Addressed This Visit       GERD (gastroesophageal reflux disease)     Omeprazole  Symptoms controlled  Monitor GI symptoms         Dementia (Multi)     Memantine  Mentation at baseline  Monitor for changes   Assist with ADLs         Seizures (Multi)     Depakote  Topiramate  Denies seizure activity  Monitor for seizure activity         Weakness - Primary     Continue working towards goals in therapy         HTN (hypertension)     Monitor BP          Medications, treatments, and labs reviewed  Continue medications and treatments as listed in EMR      Scribe Attestation  Beatriz TORRES Scribe   attest that this documentation has been prepared under the direction and in the presence of Francisca Moreno MD    Provider Attestation - Scribe documentation  All medical record entries made by the Scribe were at my  direction and personally dictated by me. I have reviewed the chart and agree that the record accurately reflects my personal performance of the history, physical exam, discussion and plan.   Francisca Moreno MD        Electronically Signed By: Francisca Moreno MD   4/13/24  5:46 PM

## 2024-04-12 NOTE — PROGRESS NOTES
PROGRESS NOTE    Subjective   Chief complaint: Adalberto Damian is a 64 y.o. male who is an acute skilled patient being seen and evaluated for weakness    HPI:  HPI  Patient does continue in therapy.  Therapy is working on positioning and alignment on mattress for patient comfort.  Patient was seen and examined at bedside, appears to be in no acute distress.  Denies chest pain or shortness of breath.  Afebrile.  Nursing staff voicing no new concerns at this time.    Objective   Vital signs: 117/76, 80, 94%    Physical Exam  Constitutional:       General: He is not in acute distress.  Cardiovascular:      Rate and Rhythm: Normal rate and regular rhythm.   Pulmonary:      Effort: Pulmonary effort is normal.      Breath sounds: Normal breath sounds.   Musculoskeletal:      Cervical back: Neck supple.      Right lower leg: Edema present.      Left lower leg: Edema present.   Neurological:      Mental Status: He is alert.      Motor: Weakness present.   Psychiatric:         Behavior: Behavior is cooperative.         Assessment/Plan   Problem List Items Addressed This Visit       GERD (gastroesophageal reflux disease)     Omeprazole  Symptoms controlled  Monitor GI symptoms         Dementia (Multi)     Memantine  Mentation at baseline  Monitor for changes   Assist with ADLs         Seizures (Multi)     Depakote  Topiramate  Denies seizure activity  Monitor for seizure activity         Weakness - Primary     Continue working towards goals in therapy         HTN (hypertension)     Monitor BP          Medications, treatments, and labs reviewed  Continue medications and treatments as listed in EMR      Scribe Attestation  I, Josefina Hdez   attest that this documentation has been prepared under the direction and in the presence of Francisca Moreno MD    Provider Attestation - Scribe documentation  All medical record entries made by the Scribe were at my direction and personally dictated by me. I have reviewed the chart  and agree that the record accurately reflects my personal performance of the history, physical exam, discussion and plan.   Francisca Moreno MD

## 2024-04-15 ENCOUNTER — NURSING HOME VISIT (OUTPATIENT)
Dept: POST ACUTE CARE | Facility: EXTERNAL LOCATION | Age: 64
End: 2024-04-15
Payer: MEDICARE

## 2024-04-15 DIAGNOSIS — F03.90 DEMENTIA, UNSPECIFIED DEMENTIA SEVERITY, UNSPECIFIED DEMENTIA TYPE, UNSPECIFIED WHETHER BEHAVIORAL, PSYCHOTIC, OR MOOD DISTURBANCE OR ANXIETY (MULTI): ICD-10-CM

## 2024-04-15 DIAGNOSIS — R53.1 WEAKNESS: Primary | ICD-10-CM

## 2024-04-15 DIAGNOSIS — I10 HYPERTENSION, UNSPECIFIED TYPE: ICD-10-CM

## 2024-04-15 PROBLEM — F41.9 ANXIETY: Status: ACTIVE | Noted: 2024-04-15

## 2024-04-15 PROCEDURE — 99308 SBSQ NF CARE LOW MDM 20: CPT | Performed by: NURSE PRACTITIONER

## 2024-04-15 NOTE — PROGRESS NOTES
PROGRESS NOTE    Subjective   Chief complaint: Adalberto Damian is a 64 y.o. male who is an acute skilled patient being seen and evaluated for weakness    HPI:  HPI  Patient is working in therapy on gross motor coordination and static balance activities during sitting.  Patient also working on strengthening activities to increase functional task performance.  Patient did have a vitamin D level obtained that resulted as 17.  Patient also seen in follow-up for pneumonia, started on Augmentin.  Denies shortness of breath.  Denies fever or chills.  Patient is restless and anxious, according to nursing.  Patient is tolerating antibiotic without adverse effects.    Objective   Vital signs: 130/62, 97.8, 85, 18, 95%    Physical Exam  Constitutional:       General: He is not in acute distress.  Cardiovascular:      Rate and Rhythm: Normal rate and regular rhythm.   Pulmonary:      Effort: Pulmonary effort is normal.      Breath sounds: Normal breath sounds.   Genitourinary:     Comments: Rey  Musculoskeletal:      Cervical back: Neck supple.      Right lower leg: No edema.   Neurological:      Mental Status: He is alert.      Motor: Weakness present.   Psychiatric:         Behavior: Behavior is cooperative.         Assessment/Plan   Problem List Items Addressed This Visit       Vitamin D deficiency     Start vitamin D 50,000 international units weekly x 12 weeks then repeat level         Weakness - Primary     Continue therapy         HTN (hypertension)     Monitor BP         Pneumonia     Continue antibiotic until complete.  Mucinex  Monitor         Anxiety     Psych consult          Medications, treatments, and labs reviewed  Continue medications and treatments as listed in EMR      Scribe Attestation  Beatriz TORRES Scribe   attest that this documentation has been prepared under the direction and in the presence of TALI Sylvester    Provider Attestation - Scribe documentation  All medical record entries  made by the Scribe were at my direction and personally dictated by me. I have reviewed the chart and agree that the record accurately reflects my personal performance of the history, physical exam, discussion and plan.   Arely Streeter, APRN-CNP

## 2024-04-15 NOTE — LETTER
Patient: Adalberto Damian  : 1960    Encounter Date: 04/15/2024    PROGRESS NOTE    Subjective  Chief complaint: Adalberto Damian is a 64 y.o. male who is an acute skilled patient being seen and evaluated for weakness    HPI:  HPI  Patient does continue in therapy, working towards goals established.  Patient is continuing to work on balance activities and core strength to improve overall functional mobility.  Therapy is also working with patient on bed mobility with positioning and alignment.  Patient seen and examined at bedside, appears to be in no acute distress.  Nursing staff voicing no new concerns at this time.    Objective  Vital signs: 130/84, 97.8, 18, 94%    Physical Exam  Constitutional:       General: He is not in acute distress.  Cardiovascular:      Rate and Rhythm: Normal rate and regular rhythm.   Pulmonary:      Effort: Pulmonary effort is normal.      Breath sounds: Normal breath sounds.      Comments: O2  Genitourinary:     Comments: Krissy  Musculoskeletal:      Cervical back: Neck supple.      Right lower leg: Edema present.      Left lower leg: Edema present.   Neurological:      Mental Status: He is alert.      Motor: Weakness present.   Psychiatric:         Behavior: Behavior is cooperative.         Assessment/Plan  Problem List Items Addressed This Visit       Dementia (Multi)     Memantine  Mentation at baseline  Monitor for changes   Assist with ADLs         HTN (hypertension)     Pressure at goal  Monitor BP         Weakness - Primary     Continue progressing towards goals in therapy          Medications, treatments, and labs reviewed  Continue medications and treatments as listed in EMR      Scribe Attestation  Beatriz TORRES Scribe   attest that this documentation has been prepared under the direction and in the presence of ISMAEL Sylvester-CNP    Provider Attestation - Scribe documentation  All medical record entries made by the Scribe were at my direction and personally  dictated by me. I have reviewed the chart and agree that the record accurately reflects my personal performance of the history, physical exam, discussion and plan.   TALI Sylvester        Electronically Signed By: TALI Sylvester   4/23/24  4:14 PM

## 2024-04-16 NOTE — PROGRESS NOTES
PROGRESS NOTE    Subjective   Chief complaint: Adalberto Damian is a 64 y.o. male who is an acute skilled patient being seen and evaluated for weakness    HPI:  HPI  Patient does continue working in therapy.  Therapy is working on positioning and alignment on mattress for patient's comfort.  Patient seen and examined at bedside.  Patient also seen f/u due to nurse calling on 4\5 reported patient had a urine obtained that was negative, potassium reported to be 3.3, hemoglobin 8.9 (was 9.2), creatinine 1.3 (was 1.9.  Orders were placed for KCl 40 mEq x1 and repeat level in 1 week.    Objective   Vital signs: 114/72, 78, 18, 98.0, 94%    Physical Exam  Constitutional:       General: He is not in acute distress.  Cardiovascular:      Rate and Rhythm: Normal rate and regular rhythm.   Pulmonary:      Effort: Pulmonary effort is normal.      Breath sounds: Normal breath sounds.      Comments: O2  Genitourinary:     Comments: Rey  Musculoskeletal:      Cervical back: Neck supple.      Right lower leg: Edema present.      Left lower leg: Edema present.   Neurological:      Mental Status: He is alert.      Motor: Weakness present.   Psychiatric:         Behavior: Behavior is cooperative.         Assessment/Plan   Problem List Items Addressed This Visit       Dementia (Multi)     Memantine  Mentation at baseline  Monitor for changes   Assist with ADLs         Weakness - Primary     Continue in therapy         CKD (chronic kidney disease)     Renal function improving  Monitor lab         Hypokalemia     Potassium replaced  Monitor lab         Anemia     Stable  Monitor lab          Medications, treatments, and labs reviewed  Continue medications and treatments as listed in EMR      Scribe Attestation  Beatriz TORRES Scribe   attest that this documentation has been prepared under the direction and in the presence of TALI Sylvester    Provider Attestation - Scribe documentation  All medical record entries made by  the Scribe were at my direction and personally dictated by me. I have reviewed the chart and agree that the record accurately reflects my personal performance of the history, physical exam, discussion and plan.   Arely Streeter, APRN-CNP

## 2024-04-16 NOTE — PROGRESS NOTES
PROGRESS NOTE    Subjective   Chief complaint: Adalberto Damian is a 64 y.o. male who is an acute skilled patient being seen and evaluated for weakness    HPI:  HPI  Patient is working in therapy.  Patient is working on static balance activities during sitting and strengthening activities to increase functional task performance.  Patient was seen and examined at bedside, appears to be in no acute distress.  Denies chest pain or shortness of breath.  Afebrile.  Nursing staff voicing no new concerns at this time.    Objective   Vital signs: 138/82, 97.7, 78, 18, 94%    Physical Exam  Constitutional:       General: He is not in acute distress.  Cardiovascular:      Rate and Rhythm: Normal rate and regular rhythm.   Pulmonary:      Effort: Pulmonary effort is normal.      Breath sounds: Normal breath sounds.      Comments: O2  Genitourinary:     Comments: Krissy  Musculoskeletal:      Cervical back: Neck supple.      Right lower leg: Edema present.      Left lower leg: Edema present.   Neurological:      Mental Status: He is alert.      Motor: Weakness present.   Psychiatric:         Behavior: Behavior is cooperative.         Assessment/Plan   Problem List Items Addressed This Visit       Dementia (Multi)     Memantine  Mentation at baseline  Monitor for changes   Assist with ADLs         Seizures (Multi)     Depakote  Topiramate  Denies seizure activity  Monitor for seizure activity         Weakness - Primary     Continue in therapy         HTN (hypertension)     Monitor BP          Medications, treatments, and labs reviewed  Continue medications and treatments as listed in EMR      Scribe Attestation  Beatriz TORRES Scribe   attest that this documentation has been prepared under the direction and in the presence of ISMAEL Sylvester-CNP    Provider Attestation - Scribe documentation  All medical record entries made by the Scribe were at my direction and personally dictated by me. I have reviewed the chart and  agree that the record accurately reflects my personal performance of the history, physical exam, discussion and plan.   Arely Streeter, APRN-CNP

## 2024-04-17 ENCOUNTER — NURSING HOME VISIT (OUTPATIENT)
Dept: POST ACUTE CARE | Facility: EXTERNAL LOCATION | Age: 64
End: 2024-04-17
Payer: MEDICARE

## 2024-04-17 DIAGNOSIS — F03.90 DEMENTIA, UNSPECIFIED DEMENTIA SEVERITY, UNSPECIFIED DEMENTIA TYPE, UNSPECIFIED WHETHER BEHAVIORAL, PSYCHOTIC, OR MOOD DISTURBANCE OR ANXIETY (MULTI): Primary | ICD-10-CM

## 2024-04-17 DIAGNOSIS — R53.1 WEAKNESS: ICD-10-CM

## 2024-04-17 DIAGNOSIS — I10 HYPERTENSION, UNSPECIFIED TYPE: ICD-10-CM

## 2024-04-17 PROCEDURE — 99308 SBSQ NF CARE LOW MDM 20: CPT | Performed by: NURSE PRACTITIONER

## 2024-04-17 NOTE — LETTER
Patient: Adalberto Damian  : 1960    Encounter Date: 2024    PROGRESS NOTE    Subjective  Chief complaint: Adalberto Damian is a 64 y.o. male who is an acute skilled patient being seen and evaluated for weakness    HPI:  HPI   Patient readmitted to skilled nursing facility after recent hospitalization.  Seen and examined at bedside in no apparent distress.  Denies shortness of breath nausea vomiting fever chills pain.  Therapy will be working with him.     Objective    Physical Exam  Constitutional:       General: He is not in acute distress.  Cardiovascular:      Rate and Rhythm: Normal rate and regular rhythm.   Pulmonary:      Effort: Pulmonary effort is normal.      Breath sounds: Normal breath sounds.      Comments: O2  Genitourinary:     Comments: Krissy   Musculoskeletal:      Cervical back: Neck supple.      Right lower leg: Edema present.      Left lower leg: Edema present.   Neurological:      Mental Status: He is alert.      Motor: Weakness present.   Psychiatric:         Behavior: Behavior is cooperative.         Assessment/Plan  Problem List Items Addressed This Visit       Dementia (Multi) - Primary     Mentation at baseline  Monitor for changes   Assist with ADLs         HTN (hypertension)     Monitor BP         Weakness     Continue working towards goals in therapy          Medications, treatments, and labs reviewed  Continue medications and treatments as listed in EMR    TALI Sylvester    Scribe Attestation  Nahomy TORRES Scribe   attest that this documentation has been prepared under the direction and in the presence of TALI Sylvester    Provider Attestation - Scribe documentation  All medical record entries made by the Scribe were at my direction and personally dictated by me. I have reviewed the chart and agree that the record accurately reflects my personal performance of the history, physical exam, discussion and plan.      Electronically Signed By: Arely DUPONT  ISMAEL Streeter-CNP   4/19/24  3:46 PM

## 2024-04-18 ENCOUNTER — NURSING HOME VISIT (OUTPATIENT)
Dept: POST ACUTE CARE | Facility: EXTERNAL LOCATION | Age: 64
End: 2024-04-18
Payer: MEDICARE

## 2024-04-18 DIAGNOSIS — R53.1 WEAKNESS: ICD-10-CM

## 2024-04-18 DIAGNOSIS — F03.90 DEMENTIA, UNSPECIFIED DEMENTIA SEVERITY, UNSPECIFIED DEMENTIA TYPE, UNSPECIFIED WHETHER BEHAVIORAL, PSYCHOTIC, OR MOOD DISTURBANCE OR ANXIETY (MULTI): Primary | ICD-10-CM

## 2024-04-18 PROCEDURE — 99308 SBSQ NF CARE LOW MDM 20: CPT | Performed by: NURSE PRACTITIONER

## 2024-04-18 NOTE — LETTER
Patient: Adalberto Damian  : 1960    Encounter Date: 2024    PROGRESS NOTE    Subjective  Chief complaint: Adalberto Damian is a 64 y.o. male who is an acute skilled patient being seen and evaluated for weakness    HPI:  HPI    Therapy has been working with the patient to improve strength and endurance with ADLs, transfers, and mobility.  Patient continues to work toward goals.  Patient is stable and has no new complaints.  Nursing staff voices no new concerns today.    Objective      Physical Exam  Constitutional:       General: He is not in acute distress.  Cardiovascular:      Rate and Rhythm: Normal rate and regular rhythm.   Pulmonary:      Effort: Pulmonary effort is normal.      Breath sounds: Normal breath sounds.      Comments: O2  Genitourinary:     Comments: Krissy   Musculoskeletal:      Cervical back: Neck supple.      Right lower leg: Edema present.      Left lower leg: Edema present.   Neurological:      Mental Status: He is alert.      Motor: Weakness present.   Psychiatric:         Behavior: Behavior is cooperative.         Assessment/Plan  Problem List Items Addressed This Visit       Dementia (Multi) - Primary     Mentation at baseline  Monitor for changes   Assist with ADLs         Weakness     Continue working towards goals in therapy          Medications, treatments, and labs reviewed  Continue medications and treatments as listed in EMR    TALI Sylvester    Scribe Attestation  Nahomy TORRES Scribe   attest that this documentation has been prepared under the direction and in the presence of TALI Sylvester    Provider Attestation - Scribe documentation  All medical record entries made by the Scribe were at my direction and personally dictated by me. I have reviewed the chart and agree that the record accurately reflects my personal performance of the history, physical exam, discussion and plan.      Electronically Signed By: TALI Sylvester   24   6:39 PM

## 2024-04-19 ENCOUNTER — NURSING HOME VISIT (OUTPATIENT)
Dept: POST ACUTE CARE | Facility: EXTERNAL LOCATION | Age: 64
End: 2024-04-19
Payer: MEDICARE

## 2024-04-19 DIAGNOSIS — K21.9 GASTROESOPHAGEAL REFLUX DISEASE, UNSPECIFIED WHETHER ESOPHAGITIS PRESENT: ICD-10-CM

## 2024-04-19 DIAGNOSIS — I10 HYPERTENSION, UNSPECIFIED TYPE: ICD-10-CM

## 2024-04-19 DIAGNOSIS — R53.1 WEAKNESS: Primary | ICD-10-CM

## 2024-04-19 DIAGNOSIS — F03.90 DEMENTIA, UNSPECIFIED DEMENTIA SEVERITY, UNSPECIFIED DEMENTIA TYPE, UNSPECIFIED WHETHER BEHAVIORAL, PSYCHOTIC, OR MOOD DISTURBANCE OR ANXIETY (MULTI): ICD-10-CM

## 2024-04-19 DIAGNOSIS — R56.9 SEIZURES (MULTI): ICD-10-CM

## 2024-04-19 PROBLEM — N17.9 AKI (ACUTE KIDNEY INJURY) (CMS-HCC): Status: RESOLVED | Noted: 2024-03-22 | Resolved: 2024-04-19

## 2024-04-19 PROCEDURE — 99309 SBSQ NF CARE MODERATE MDM 30: CPT | Performed by: INTERNAL MEDICINE

## 2024-04-19 NOTE — PROGRESS NOTES
PROGRESS NOTE    Subjective   Chief complaint: Adalberto Damian is a 64 y.o. male who is an acute skilled patient being seen and evaluated for weakness    HPI:  HPI   Patient readmitted to skilled nursing facility after recent hospitalization.  Seen and examined at bedside in no apparent distress.  Denies shortness of breath nausea vomiting fever chills pain.  Therapy will be working with him.     Objective     Physical Exam  Constitutional:       General: He is not in acute distress.  Cardiovascular:      Rate and Rhythm: Normal rate and regular rhythm.   Pulmonary:      Effort: Pulmonary effort is normal.      Breath sounds: Normal breath sounds.      Comments: O2  Genitourinary:     Comments: Krissy   Musculoskeletal:      Cervical back: Neck supple.      Right lower leg: Edema present.      Left lower leg: Edema present.   Neurological:      Mental Status: He is alert.      Motor: Weakness present.   Psychiatric:         Behavior: Behavior is cooperative.         Assessment/Plan   Problem List Items Addressed This Visit       Dementia (Multi) - Primary     Mentation at baseline  Monitor for changes   Assist with ADLs         HTN (hypertension)     Monitor BP         Weakness     Continue working towards goals in therapy          Medications, treatments, and labs reviewed  Continue medications and treatments as listed in EMR    TALI Sylvester    Scribe Attestation  Nahomy TORRES   attest that this documentation has been prepared under the direction and in the presence of TALI Sylvester    Provider Attestation - Scribe documentation  All medical record entries made by the Scribe were at my direction and personally dictated by me. I have reviewed the chart and agree that the record accurately reflects my personal performance of the history, physical exam, discussion and plan.

## 2024-04-19 NOTE — PROGRESS NOTES
PROGRESS NOTE    Subjective   Chief complaint: Adalberto Damian is a 64 y.o. male who is an acute skilled patient being seen and evaluated for weakness    HPI:  HPI  Patient was sent to ED following a fall.  Patient returned with no new orders, imaging negative workup showed no acute process.  Patient does continue working in therapy working on balance training and strengthening activities and bed mobility.  Patient seen and examined at bedside, appears to be in no acute distress.  Denies chest pain or shortness of breath.  Afebrile.    Objective   Vital signs: 141/87, 85, 18, 98.2, 96%    Physical Exam  Constitutional:       General: He is not in acute distress.  Cardiovascular:      Rate and Rhythm: Normal rate and regular rhythm.   Pulmonary:      Effort: Pulmonary effort is normal.      Breath sounds: Normal breath sounds.   Musculoskeletal:      Cervical back: Neck supple.      Right lower leg: Edema present.      Left lower leg: Edema present.   Neurological:      Mental Status: He is alert.      Motor: Weakness present.   Psychiatric:         Behavior: Behavior is cooperative.         Assessment/Plan   Problem List Items Addressed This Visit       GERD (gastroesophageal reflux disease)     Omeprazole  Symptoms controlled  Monitor GI symptoms         Dementia (Multi)     Memantine  Mentation at baseline  Monitor for changes   Assist with ADLs         Seizures (Multi)     Depakote  Topiramate  Denies seizure activity  Monitor for seizure activity         Weakness - Primary     Continuing therapy, progressing towards goals         HTN (hypertension)     Monitor BP          Medications, treatments, and labs reviewed  Continue medications and treatments as listed in EMR      Scribe Attestation  I, Beatriz Cline, Latriciaibjavi   attest that this documentation has been prepared under the direction and in the presence of Francisca Moreno MD    Provider Attestation - Scribe documentation  All medical record entries made by the  Scribe were at my direction and personally dictated by me. I have reviewed the chart and agree that the record accurately reflects my personal performance of the history, physical exam, discussion and plan.   Francisca Moreno MD

## 2024-04-19 NOTE — LETTER
Patient: Adalberto Damian  : 1960    Encounter Date: 2024    PROGRESS NOTE    Subjective  Chief complaint: Adalberto Damian is a 64 y.o. male who is an acute skilled patient being seen and evaluated for weakness    HPI:  HPI  Patient was sent to ED following a fall.  Patient returned with no new orders, imaging negative workup showed no acute process.  Patient does continue working in therapy working on balance training and strengthening activities and bed mobility.  Patient seen and examined at bedside, appears to be in no acute distress.  Denies chest pain or shortness of breath.  Afebrile.    Objective  Vital signs: 141/87, 85, 18, 98.2, 96%    Physical Exam  Constitutional:       General: He is not in acute distress.  Cardiovascular:      Rate and Rhythm: Normal rate and regular rhythm.   Pulmonary:      Effort: Pulmonary effort is normal.      Breath sounds: Normal breath sounds.   Musculoskeletal:      Cervical back: Neck supple.      Right lower leg: Edema present.      Left lower leg: Edema present.   Neurological:      Mental Status: He is alert.      Motor: Weakness present.   Psychiatric:         Behavior: Behavior is cooperative.         Assessment/Plan  Problem List Items Addressed This Visit       GERD (gastroesophageal reflux disease)     Omeprazole  Symptoms controlled  Monitor GI symptoms         Dementia (Multi)     Memantine  Mentation at baseline  Monitor for changes   Assist with ADLs         Seizures (Multi)     Depakote  Topiramate  Denies seizure activity  Monitor for seizure activity         Weakness - Primary     Continuing therapy, progressing towards goals         HTN (hypertension)     Monitor BP          Medications, treatments, and labs reviewed  Continue medications and treatments as listed in EMR      Scribe Attestation  MELISSA, Beatriz Cline, Josefina   attest that this documentation has been prepared under the direction and in the presence of Francisca Moreno MD    Provider  Attestation - Scribe documentation  All medical record entries made by the Scribe were at my direction and personally dictated by me. I have reviewed the chart and agree that the record accurately reflects my personal performance of the history, physical exam, discussion and plan.   Francisca Moreno MD        Electronically Signed By: Francisca Moreno MD   4/19/24  5:06 PM

## 2024-04-21 NOTE — PROGRESS NOTES
PROGRESS NOTE    Subjective   Chief complaint: Adalberto Damian is a 64 y.o. male who is an acute skilled patient being seen and evaluated for weakness    HPI:  HPI    Therapy has been working with the patient to improve strength and endurance with ADLs, transfers, and mobility.  Patient continues to work toward goals.  Patient is stable and has no new complaints.  Nursing staff voices no new concerns today.    Objective       Physical Exam  Constitutional:       General: He is not in acute distress.  Cardiovascular:      Rate and Rhythm: Normal rate and regular rhythm.   Pulmonary:      Effort: Pulmonary effort is normal.      Breath sounds: Normal breath sounds.      Comments: O2  Genitourinary:     Comments: Krissy   Musculoskeletal:      Cervical back: Neck supple.      Right lower leg: Edema present.      Left lower leg: Edema present.   Neurological:      Mental Status: He is alert.      Motor: Weakness present.   Psychiatric:         Behavior: Behavior is cooperative.         Assessment/Plan   Problem List Items Addressed This Visit       Dementia (Multi) - Primary     Mentation at baseline  Monitor for changes   Assist with ADLs         Weakness     Continue working towards goals in therapy          Medications, treatments, and labs reviewed  Continue medications and treatments as listed in EMR    TALI Sylvester    Scribe Attestation  Nahomy TORRES   attest that this documentation has been prepared under the direction and in the presence of TALI Sylvester    Provider Attestation - Scribe documentation  All medical record entries made by the Scribe were at my direction and personally dictated by me. I have reviewed the chart and agree that the record accurately reflects my personal performance of the history, physical exam, discussion and plan.

## 2024-04-22 ENCOUNTER — NURSING HOME VISIT (OUTPATIENT)
Dept: POST ACUTE CARE | Facility: EXTERNAL LOCATION | Age: 64
End: 2024-04-22
Payer: MEDICARE

## 2024-04-22 DIAGNOSIS — R53.1 WEAKNESS: Primary | ICD-10-CM

## 2024-04-22 DIAGNOSIS — R56.9 SEIZURES (MULTI): ICD-10-CM

## 2024-04-22 DIAGNOSIS — F03.90 DEMENTIA, UNSPECIFIED DEMENTIA SEVERITY, UNSPECIFIED DEMENTIA TYPE, UNSPECIFIED WHETHER BEHAVIORAL, PSYCHOTIC, OR MOOD DISTURBANCE OR ANXIETY (MULTI): ICD-10-CM

## 2024-04-22 DIAGNOSIS — K21.9 GASTROESOPHAGEAL REFLUX DISEASE, UNSPECIFIED WHETHER ESOPHAGITIS PRESENT: ICD-10-CM

## 2024-04-22 DIAGNOSIS — I10 HYPERTENSION, UNSPECIFIED TYPE: ICD-10-CM

## 2024-04-22 PROCEDURE — 99308 SBSQ NF CARE LOW MDM 20: CPT | Performed by: NURSE PRACTITIONER

## 2024-04-22 NOTE — LETTER
Patient: Adalberto Damian  : 1960    Encounter Date: 2024    PROGRESS NOTE    Subjective  Chief complaint: Adalberto Damian is a 64 y.o. male who is an acute skilled patient being seen and evaluated for weakness    HPI:  HPI  Patient is working in therapy due to weakness.  Working on strengthening exercises/activities, gait training, transfers and ADLs.  Patient does require Beau lift for transfers. For mobility, patient is using wheelchair.  Patient was seen and examined at the bedside, appears to be in no acute distress.  Patient has no complaints.  Denies n/v/f/c.  No new concerns reported by staff.     Objective  Vital signs: 139/78, 85, 18, 97.7, 95%    Physical Exam  Constitutional:       General: He is not in acute distress.  Cardiovascular:      Rate and Rhythm: Normal rate and regular rhythm.   Pulmonary:      Effort: Pulmonary effort is normal.      Breath sounds: Normal breath sounds.   Genitourinary:     Comments: Rey  Musculoskeletal:      Cervical back: Neck supple.      Right lower leg: Edema present.      Left lower leg: Edema present.   Neurological:      Mental Status: He is alert.      Motor: Weakness present.   Psychiatric:         Behavior: Behavior is cooperative.         Assessment/Plan  Problem List Items Addressed This Visit       GERD (gastroesophageal reflux disease)     Omeprazole  Symptoms controlled  Monitor GI symptoms         Dementia (Multi)     Memantine  Mentation at baseline  Monitor for changes   Assist with care as needed         Seizures (Multi)     Depakote  Topiramate  Denies seizure activity  Monitor for seizure activity         Weakness - Primary      continue to work towards established goals         HTN (hypertension)     Monitor BP          Medications, treatments, and labs reviewed  Continue medications and treatments as listed in EMR      Latriciaibjavi Attestation  I, Josefina Hdez   attest that this documentation has been prepared under the direction and  in the presence of TALI Sylvester    Provider Attestation - Scribe documentation  All medical record entries made by the Scribe were at my direction and personally dictated by me. I have reviewed the chart and agree that the record accurately reflects my personal performance of the history, physical exam, discussion and plan.   TALI Sylvester        Electronically Signed By: TALI Sylvester   4/24/24  1:59 PM

## 2024-04-22 NOTE — PROGRESS NOTES
PROGRESS NOTE    Subjective   Chief complaint: Adalberto Damian is a 64 y.o. male who is an acute skilled patient being seen and evaluated for weakness    HPI:  HPI  Therapy continues to work with patient due to generalized weakness.  Therapy is working with patient on standing.  Patient also working on bed mobility for comfort.  Patient was seen and examined at bedside, appears to be in no acute distress.  Denies chest pain or shortness of breath.  Denies fever or chills.    Objective   Vital signs: 117/62, 18, 80, 94%    Physical Exam  Constitutional:       General: He is not in acute distress.  Cardiovascular:      Rate and Rhythm: Normal rate and regular rhythm.   Pulmonary:      Effort: Pulmonary effort is normal.      Breath sounds: Normal breath sounds.      Comments: O2  Genitourinary:     Comments: Rey  Musculoskeletal:      Cervical back: Neck supple.      Right lower leg: Edema present.      Left lower leg: Edema present.   Neurological:      Mental Status: He is alert.      Motor: Weakness present.   Psychiatric:         Behavior: Behavior is cooperative.         Assessment/Plan   Problem List Items Addressed This Visit       Dementia (Multi)     Memantine  Mentation at baseline  Monitor for changes   Assist with ADLs         HTN (hypertension)     Blood pressure at goal  Monitor BP         Weakness - Primary     Continue in therapy          Medications, treatments, and labs reviewed  Continue medications and treatments as listed in EMR      Scribe Attestation  Beatriz TORRES Scribe   attest that this documentation has been prepared under the direction and in the presence of ISMAEL Sylvester-CNP    Provider Attestation - Scribe documentation  All medical record entries made by the Scribe were at my direction and personally dictated by me. I have reviewed the chart and agree that the record accurately reflects my personal performance of the history, physical exam, discussion and plan.   Arely  A Streeter, APRN-CNP

## 2024-04-23 ENCOUNTER — NURSING HOME VISIT (OUTPATIENT)
Dept: POST ACUTE CARE | Facility: EXTERNAL LOCATION | Age: 64
End: 2024-04-23
Payer: MEDICARE

## 2024-04-23 DIAGNOSIS — I10 HYPERTENSION, UNSPECIFIED TYPE: ICD-10-CM

## 2024-04-23 DIAGNOSIS — F20.0 PARANOID SCHIZOPHRENIA (MULTI): ICD-10-CM

## 2024-04-23 DIAGNOSIS — K21.9 GASTROESOPHAGEAL REFLUX DISEASE, UNSPECIFIED WHETHER ESOPHAGITIS PRESENT: ICD-10-CM

## 2024-04-23 DIAGNOSIS — F03.90 DEMENTIA, UNSPECIFIED DEMENTIA SEVERITY, UNSPECIFIED DEMENTIA TYPE, UNSPECIFIED WHETHER BEHAVIORAL, PSYCHOTIC, OR MOOD DISTURBANCE OR ANXIETY (MULTI): ICD-10-CM

## 2024-04-23 DIAGNOSIS — R53.1 WEAKNESS: ICD-10-CM

## 2024-04-23 PROCEDURE — 99309 SBSQ NF CARE MODERATE MDM 30: CPT | Performed by: INTERNAL MEDICINE

## 2024-04-23 NOTE — PROGRESS NOTES
PROGRESS NOTE    Subjective   Chief complaint: Adalberto Damian is a 64 y.o. male who is an acute skilled patient being seen and evaluated for weakness    HPI:  HPI  Patient is working in therapy due to weakness.  Working on strengthening exercises/activities, gait training, transfers and ADLs.  Patient does require Beau lift for transfers. For mobility, patient is using wheelchair.  Patient was seen and examined at the bedside, appears to be in no acute distress.  Patient has no complaints.  Denies n/v/f/c.  No new concerns reported by staff.     Objective   Vital signs: 139/78, 85, 18, 97.7, 95%    Physical Exam  Constitutional:       General: He is not in acute distress.  Cardiovascular:      Rate and Rhythm: Normal rate and regular rhythm.   Pulmonary:      Effort: Pulmonary effort is normal.      Breath sounds: Normal breath sounds.   Genitourinary:     Comments: Rey  Musculoskeletal:      Cervical back: Neck supple.      Right lower leg: Edema present.      Left lower leg: Edema present.   Neurological:      Mental Status: He is alert.      Motor: Weakness present.   Psychiatric:         Behavior: Behavior is cooperative.         Assessment/Plan   Problem List Items Addressed This Visit       GERD (gastroesophageal reflux disease)     Omeprazole  Symptoms controlled  Monitor GI symptoms         Dementia (Multi)     Memantine  Mentation at baseline  Monitor for changes   Assist with care as needed         Seizures (Multi)     Depakote  Topiramate  Denies seizure activity  Monitor for seizure activity         Weakness - Primary      continue to work towards established goals         HTN (hypertension)     Monitor BP          Medications, treatments, and labs reviewed  Continue medications and treatments as listed in EMR      Scribe Attestation  I, Beatriz Cline, Josefina   attest that this documentation has been prepared under the direction and in the presence of ISMAEL Sylvester-SHAKIRA    Provider  Attestation - Scribe documentation  All medical record entries made by the Scribe were at my direction and personally dictated by me. I have reviewed the chart and agree that the record accurately reflects my personal performance of the history, physical exam, discussion and plan.   Arely Streeter, APRN-CNP

## 2024-04-23 NOTE — LETTER
Patient: Adalberto Damian  : 1960    Encounter Date: 2024    PROGRESS NOTE    Subjective  Chief complaint: Adalberto Damian is a 64 y.o. male who is an acute skilled patient being seen and evaluated for weakness    HPI:  Patient presents for f/u therapy and general medical care.  Patient seen and examined at beside.  Therapy has been working with the patient to improve strength, endurance, ADLs, and transfers d/t generalized weakness. Patient was sent to ED yesterday d\t bolton complications. Patient was returned to facility with 3 way coude catheter. Hematuria still present but improving.  Patient has no acute concerns today.      Objective  Vital signs: 139/78,89,96%    Physical Exam  Constitutional:       General: He is not in acute distress.  Cardiovascular:      Rate and Rhythm: Normal rate and regular rhythm.   Pulmonary:      Effort: Pulmonary effort is normal.      Breath sounds: Normal breath sounds.   Musculoskeletal:      Cervical back: Neck supple.      Right lower leg: Edema present.      Left lower leg: Edema present.   Neurological:      Mental Status: He is alert.      Motor: Weakness present.   Psychiatric:         Behavior: Behavior is cooperative.         Assessment/Plan  Problem List Items Addressed This Visit       GERD (gastroesophageal reflux disease)     Omeprazole  Symptoms controlled  Monitor GI symptoms         Paranoid schizophrenia (Multi)     Risperidone  Controlled, monitor for changes          Dementia (Multi)     Memantine  Mentation at baseline  Monitor for changes   Assist with care as needed         Weakness      continue to work towards established goals         HTN (hypertension)     Monitor BP          Medications, treatments, and labs reviewed  Continue medications and treatments as listed in EMR    Scribe Attestation  Nicole TORRES, Josefina   attest that this documentation has been prepared under the direction and in the presence of Francisca Moreno MD.     Provider  Attestation - Scribe documentation  All medical record entries made by the Scribe were at my direction and personally dictated by me. I have reviewed the chart and agree that the record accurately reflects my personal performance of the history, physical exam, discussion and plan.   Francisca Moreno MD      Electronically Signed By: Francisca Moreno MD   4/23/24  4:15 PM

## 2024-04-23 NOTE — ASSESSMENT & PLAN NOTE
continue to work towards established goals  
Body Location Override (Optional - Billing Will Still Be Based On Selected Body Map Location If Applicable): nasal dorsum
Depakote  Topiramate  Denies seizure activity  Monitor for seizure activity  
Memantine  Mentation at baseline  Monitor for changes   Assist with care as needed  
Monitor BP  
Omeprazole  Symptoms controlled  Monitor GI symptoms  
Add 41256 Cpt? (Important Note: In 2017 The Use Of 47771 Is Being Tracked By Cms To Determine Future Global Period Reimbursement For Global Periods): yes
Detail Level: Detailed

## 2024-04-23 NOTE — PROGRESS NOTES
PROGRESS NOTE    Subjective   Chief complaint: Adalberto Damian is a 64 y.o. male who is an acute skilled patient being seen and evaluated for weakness    HPI:  Patient presents for f/u therapy and general medical care.  Patient seen and examined at beside.  Therapy has been working with the patient to improve strength, endurance, ADLs, and transfers d/t generalized weakness. Patient was sent to ED yesterday d\t bolton complications. Patient was returned to facility with 3 way coude catheter. Hematuria still present but improving.  Patient has no acute concerns today.      Objective   Vital signs: 139/78,89,96%    Physical Exam  Constitutional:       General: He is not in acute distress.  Cardiovascular:      Rate and Rhythm: Normal rate and regular rhythm.   Pulmonary:      Effort: Pulmonary effort is normal.      Breath sounds: Normal breath sounds.   Musculoskeletal:      Cervical back: Neck supple.      Right lower leg: Edema present.      Left lower leg: Edema present.   Neurological:      Mental Status: He is alert.      Motor: Weakness present.   Psychiatric:         Behavior: Behavior is cooperative.         Assessment/Plan   Problem List Items Addressed This Visit       GERD (gastroesophageal reflux disease)     Omeprazole  Symptoms controlled  Monitor GI symptoms         Paranoid schizophrenia (Multi)     Risperidone  Controlled, monitor for changes          Dementia (Multi)     Memantine  Mentation at baseline  Monitor for changes   Assist with care as needed         Weakness      continue to work towards established goals         HTN (hypertension)     Monitor BP          Medications, treatments, and labs reviewed  Continue medications and treatments as listed in EMR    Scribe Attestation  INicole Scribe   attest that this documentation has been prepared under the direction and in the presence of Francisca Moreno MD.     Provider Attestation - Scribe documentation  All medical record entries made  by the Scribe were at my direction and personally dictated by me. I have reviewed the chart and agree that the record accurately reflects my personal performance of the history, physical exam, discussion and plan.   Francisca Moreno MD

## 2024-04-23 NOTE — PROGRESS NOTES
PROGRESS NOTE    Subjective   Chief complaint: Adalberto Damian is a 64 y.o. male who is an acute skilled patient being seen and evaluated for weakness    HPI:  HPI  Patient does continue in therapy, working towards goals established.  Patient is continuing to work on balance activities and core strength to improve overall functional mobility.  Therapy is also working with patient on bed mobility with positioning and alignment.  Patient seen and examined at bedside, appears to be in no acute distress.  Nursing staff voicing no new concerns at this time.    Objective   Vital signs: 130/84, 97.8, 18, 94%    Physical Exam  Constitutional:       General: He is not in acute distress.  Cardiovascular:      Rate and Rhythm: Normal rate and regular rhythm.   Pulmonary:      Effort: Pulmonary effort is normal.      Breath sounds: Normal breath sounds.      Comments: O2  Genitourinary:     Comments: Krissy  Musculoskeletal:      Cervical back: Neck supple.      Right lower leg: Edema present.      Left lower leg: Edema present.   Neurological:      Mental Status: He is alert.      Motor: Weakness present.   Psychiatric:         Behavior: Behavior is cooperative.         Assessment/Plan   Problem List Items Addressed This Visit       Dementia (Multi)     Memantine  Mentation at baseline  Monitor for changes   Assist with ADLs         HTN (hypertension)     Pressure at goal  Monitor BP         Weakness - Primary     Continue progressing towards goals in therapy          Medications, treatments, and labs reviewed  Continue medications and treatments as listed in EMR      Scribe Attestation  IBeatriz Scribe   attest that this documentation has been prepared under the direction and in the presence of ISMAEL Sylvester-CNP    Provider Attestation - Scribe documentation  All medical record entries made by the Scribe were at my direction and personally dictated by me. I have reviewed the chart and agree that the record  accurately reflects my personal performance of the history, physical exam, discussion and plan.   Arely Srteeter, APRN-CNP

## 2024-04-24 ENCOUNTER — NURSING HOME VISIT (OUTPATIENT)
Dept: POST ACUTE CARE | Facility: EXTERNAL LOCATION | Age: 64
End: 2024-04-24
Payer: MEDICARE

## 2024-04-24 DIAGNOSIS — R53.1 WEAKNESS: Primary | ICD-10-CM

## 2024-04-24 DIAGNOSIS — F03.90 DEMENTIA, UNSPECIFIED DEMENTIA SEVERITY, UNSPECIFIED DEMENTIA TYPE, UNSPECIFIED WHETHER BEHAVIORAL, PSYCHOTIC, OR MOOD DISTURBANCE OR ANXIETY (MULTI): ICD-10-CM

## 2024-04-24 DIAGNOSIS — R31.9 HEMATURIA, UNSPECIFIED TYPE: ICD-10-CM

## 2024-04-24 DIAGNOSIS — R56.9 SEIZURES (MULTI): ICD-10-CM

## 2024-04-24 DIAGNOSIS — I10 HYPERTENSION, UNSPECIFIED TYPE: ICD-10-CM

## 2024-04-24 PROCEDURE — 99308 SBSQ NF CARE LOW MDM 20: CPT | Performed by: NURSE PRACTITIONER

## 2024-04-24 NOTE — LETTER
Patient: Adalberto Damian  : 1960    Encounter Date: 2024    PROGRESS NOTE    Subjective  Chief complaint: Adalberto Damian is a 64 y.o. male who is an acute skilled patient being seen and evaluated for weakness    HPI:  HPI  Patient presents for f/u therapy and general medical care.  Patient seen and examined at bedside.  Therapy has been working with the patient to improve strength, endurance, ADLs, and transfers d/t generalized weakness.  Patient appears to be in no acute distress.  Nursing is reporting patient was pulling on catheter, now with hematuria.    Objective  Vital signs: 135/78, 83, 18, 98.2, 96%    Physical Exam  Constitutional:       General: He is not in acute distress.  Cardiovascular:      Rate and Rhythm: Normal rate and regular rhythm.   Pulmonary:      Effort: Pulmonary effort is normal.      Breath sounds: Normal breath sounds.   Genitourinary:     Comments: Rey-hematuria  Musculoskeletal:      Cervical back: Neck supple.      Right lower leg: Edema present.      Left lower leg: Edema present.   Neurological:      Mental Status: He is alert.      Motor: Weakness present.   Psychiatric:         Behavior: Behavior is cooperative.         Assessment/Plan  Problem List Items Addressed This Visit       Dementia (Multi)     Memantine  Mentation at baseline  Monitor for changes   Assist with care as needed         Seizures (Multi)     Depakote  Topiramate  Denies seizure activity  Monitor for seizure activity         Weakness - Primary     Work in therapy towards goals         HTN (hypertension)     Monitor BP         Hematuria     Irrigate catheter  Hold anticoagulant  Obtain CBC          Medications, treatments, and labs reviewed  Continue medications and treatments as listed in EMR      Scribe Attestation  IBeatriz Scribe   attest that this documentation has been prepared under the direction and in the presence of ISMAEL Sylvester-CNP    Provider Attestation - Scribe  documentation  All medical record entries made by the Scribe were at my direction and personally dictated by me. I have reviewed the chart and agree that the record accurately reflects my personal performance of the history, physical exam, discussion and plan.   TALI Sylvester        Electronically Signed By: TALI Sylvester   5/3/24  5:17 PM

## 2024-04-25 ENCOUNTER — NURSING HOME VISIT (OUTPATIENT)
Dept: POST ACUTE CARE | Facility: EXTERNAL LOCATION | Age: 64
End: 2024-04-25
Payer: MEDICARE

## 2024-04-25 DIAGNOSIS — I10 HYPERTENSION, UNSPECIFIED TYPE: ICD-10-CM

## 2024-04-25 DIAGNOSIS — R53.1 WEAKNESS: Primary | ICD-10-CM

## 2024-04-25 DIAGNOSIS — F03.90 DEMENTIA, UNSPECIFIED DEMENTIA SEVERITY, UNSPECIFIED DEMENTIA TYPE, UNSPECIFIED WHETHER BEHAVIORAL, PSYCHOTIC, OR MOOD DISTURBANCE OR ANXIETY (MULTI): ICD-10-CM

## 2024-04-25 DIAGNOSIS — R31.9 HEMATURIA, UNSPECIFIED TYPE: ICD-10-CM

## 2024-04-25 DIAGNOSIS — R56.9 SEIZURES (MULTI): ICD-10-CM

## 2024-04-25 PROCEDURE — 99308 SBSQ NF CARE LOW MDM 20: CPT | Performed by: NURSE PRACTITIONER

## 2024-04-25 NOTE — LETTER
Patient: Adalberto Damian  : 1960    Encounter Date: 2024    PROGRESS NOTE    Subjective  Chief complaint: Adalberto Damian is a 64 y.o. male who is an acute skilled patient being seen and evaluated for weakness    HPI:  HPI  Therapy has been working with the patient to improve strength and endurance with ADLs, transfers, and mobility.  Patient continues to work toward goals.  Patient was seen and examined at the bedside, appears to be in no acute distress.  Patient does continue with dark red urine in Rey.  Mentation at baseline.    Objective  Vital signs: 135/78, 83, 18, 98.2, 96%    Physical Exam  Constitutional:       General: He is not in acute distress.  Cardiovascular:      Rate and Rhythm: Normal rate and regular rhythm.   Pulmonary:      Effort: Pulmonary effort is normal.      Breath sounds: Normal breath sounds.   Genitourinary:     Comments: Rey-urine dark red  Musculoskeletal:      Cervical back: Neck supple.      Right lower leg: Edema present.      Left lower leg: Edema present.   Neurological:      Mental Status: He is alert.      Motor: Weakness present.   Psychiatric:         Behavior: Behavior is cooperative.         Assessment/Plan  Problem List Items Addressed This Visit       Dementia (Multi)     Memantine  Mentation at baseline  Monitor for changes   Assist with care as needed         Seizures (Multi)     Depakote  Topiramate  Denies seizure activity  Monitor for seizure activity         Weakness - Primary     Continue working in therapy towards goals         HTN (hypertension)     Monitor BP         Hematuria     Continue to irrigate catheter and hold heparin          Medications, treatments, and labs reviewed  Continue medications and treatments as listed in EMR      Scribe Attestation  Beatriz TORRES Scribe   attest that this documentation has been prepared under the direction and in the presence of ISMAEL Sylvester-CNP    Provider Attestation - Josefina  documentation  All medical record entries made by the Scribe were at my direction and personally dictated by me. I have reviewed the chart and agree that the record accurately reflects my personal performance of the history, physical exam, discussion and plan.   TALI Sylvester        Electronically Signed By: TALI Sylvester   5/4/24  2:32 PM

## 2024-04-26 ENCOUNTER — NURSING HOME VISIT (OUTPATIENT)
Dept: POST ACUTE CARE | Facility: EXTERNAL LOCATION | Age: 64
End: 2024-04-26
Payer: MEDICARE

## 2024-04-26 DIAGNOSIS — F03.90 DEMENTIA, UNSPECIFIED DEMENTIA SEVERITY, UNSPECIFIED DEMENTIA TYPE, UNSPECIFIED WHETHER BEHAVIORAL, PSYCHOTIC, OR MOOD DISTURBANCE OR ANXIETY (MULTI): ICD-10-CM

## 2024-04-26 DIAGNOSIS — D64.9 ANEMIA, UNSPECIFIED TYPE: ICD-10-CM

## 2024-04-26 DIAGNOSIS — I10 HYPERTENSION, UNSPECIFIED TYPE: ICD-10-CM

## 2024-04-26 DIAGNOSIS — R56.9 SEIZURES (MULTI): ICD-10-CM

## 2024-04-26 DIAGNOSIS — R53.1 WEAKNESS: Primary | ICD-10-CM

## 2024-04-26 PROCEDURE — 99308 SBSQ NF CARE LOW MDM 20: CPT | Performed by: INTERNAL MEDICINE

## 2024-04-26 NOTE — PROGRESS NOTES
PROGRESS NOTE    Subjective   Chief complaint: Adalberto Damian is a 64 y.o. male who is an acute skilled patient being seen and evaluated for weakness    HPI:  HPI  Patient presents for f/u therapy and general medical care.  Patient seen and examined at bedside.  Therapy has been working with the patient to improve strength, endurance, ADLs, and transfers d/t generalized weakness.  Patient has no acute concerns today.  Patient appears to be in no acute distress.  Nursing staff voicing no new concerns.  Patient is also waiting on repeat CBC to be obtained due to low hemoglobin.    Objective   Vital signs: 135/78, 83, 96%    Physical Exam  Constitutional:       General: He is not in acute distress.  Cardiovascular:      Rate and Rhythm: Normal rate and regular rhythm.   Pulmonary:      Effort: Pulmonary effort is normal.      Breath sounds: Normal breath sounds.   Musculoskeletal:      Cervical back: Neck supple.      Right lower leg: Edema present.      Left lower leg: Edema present.   Neurological:      Mental Status: He is alert.      Motor: Weakness present.   Psychiatric:         Behavior: Behavior is cooperative.         Assessment/Plan   Problem List Items Addressed This Visit       Dementia (Multi)     Memantine  Mentation at baseline  Monitor for changes   Assist with care as needed         Seizures (Multi)     Depakote  Topiramate  Denies seizure activity  Monitor for seizure activity         Weakness - Primary     Continue working towards established goals         HTN (hypertension)     Monitor BP         Anemia     Awaiting repeat CBC  Continue to monitor          Medications, treatments, and labs reviewed  Continue medications and treatments as listed in EMR      Scribe Attestation  I, Beatriz Cline, Larticiaibjavi   attest that this documentation has been prepared under the direction and in the presence of Francisca Moreno MD    Provider Attestation - Scribe documentation  All medical record entries made by the  Scribe were at my direction and personally dictated by me. I have reviewed the chart and agree that the record accurately reflects my personal performance of the history, physical exam, discussion and plan.   Francisca Moreno MD

## 2024-04-26 NOTE — LETTER
Patient: Adalberto Damian  : 1960    Encounter Date: 2024    PROGRESS NOTE    Subjective  Chief complaint: Adalberto Damian is a 64 y.o. male who is an acute skilled patient being seen and evaluated for weakness    HPI:  HPI  Patient presents for f/u therapy and general medical care.  Patient seen and examined at bedside.  Therapy has been working with the patient to improve strength, endurance, ADLs, and transfers d/t generalized weakness.  Patient has no acute concerns today.  Patient appears to be in no acute distress.  Nursing staff voicing no new concerns.  Patient is also waiting on repeat CBC to be obtained due to low hemoglobin.    Objective  Vital signs: 135/78, 83, 96%    Physical Exam  Constitutional:       General: He is not in acute distress.  Cardiovascular:      Rate and Rhythm: Normal rate and regular rhythm.   Pulmonary:      Effort: Pulmonary effort is normal.      Breath sounds: Normal breath sounds.   Musculoskeletal:      Cervical back: Neck supple.      Right lower leg: Edema present.      Left lower leg: Edema present.   Neurological:      Mental Status: He is alert.      Motor: Weakness present.   Psychiatric:         Behavior: Behavior is cooperative.         Assessment/Plan  Problem List Items Addressed This Visit       Dementia (Multi)     Memantine  Mentation at baseline  Monitor for changes   Assist with care as needed         Seizures (Multi)     Depakote  Topiramate  Denies seizure activity  Monitor for seizure activity         Weakness - Primary     Continue working towards established goals         HTN (hypertension)     Monitor BP         Anemia     Awaiting repeat CBC  Continue to monitor          Medications, treatments, and labs reviewed  Continue medications and treatments as listed in EMR      Scribe Attestation  MELISSA, Betariz Cline, Josefina   attest that this documentation has been prepared under the direction and in the presence of Francisca Moreno MD    Provider  Attestation - Scribe documentation  All medical record entries made by the Scribe were at my direction and personally dictated by me. I have reviewed the chart and agree that the record accurately reflects my personal performance of the history, physical exam, discussion and plan.   Francisca Moreno MD        Electronically Signed By: Francisca Moreno MD   4/28/24  7:35 AM

## 2024-04-29 ENCOUNTER — NURSING HOME VISIT (OUTPATIENT)
Dept: POST ACUTE CARE | Facility: EXTERNAL LOCATION | Age: 64
End: 2024-04-29
Payer: MEDICARE

## 2024-04-29 DIAGNOSIS — I10 HYPERTENSION, UNSPECIFIED TYPE: ICD-10-CM

## 2024-04-29 DIAGNOSIS — R53.1 WEAKNESS: Primary | ICD-10-CM

## 2024-04-29 DIAGNOSIS — F03.90 DEMENTIA, UNSPECIFIED DEMENTIA SEVERITY, UNSPECIFIED DEMENTIA TYPE, UNSPECIFIED WHETHER BEHAVIORAL, PSYCHOTIC, OR MOOD DISTURBANCE OR ANXIETY (MULTI): ICD-10-CM

## 2024-04-29 PROBLEM — R31.9 HEMATURIA: Status: ACTIVE | Noted: 2024-04-29

## 2024-04-29 PROCEDURE — 99308 SBSQ NF CARE LOW MDM 20: CPT | Performed by: NURSE PRACTITIONER

## 2024-04-29 NOTE — LETTER
Denisha Casanova discharged to facility.   Patient provided with the following educational materials upon discharge:  AVS transition summary.   Valuables and belongings sent with patient.   discharge summary, discharge instructions, medications and follow up appointments reviewed with patient.  Patient verbalized understanding. Report called and given to RN at [facility]. PICC and tele removed before discharge. VSS.     Patient: Adalberto Damian  : 1960    Encounter Date: 2024    PROGRESS NOTE    Subjective  Chief complaint: Adalberto Damian is a 64 y.o. male who is an acute skilled patient being seen and evaluated for weakness    HPI:  HPI  Patient has been working in therapy to improve strength, endurance, and ADLs.  Patient continues to work toward goals.  Patient was seen and examined at the bedside, appears to be in no acute distress.  No new concerns today.  Denies n/v/f/c pain.    Objective  Vital signs: 131/72, 18, 88, 97%    Physical Exam  Constitutional:       General: He is not in acute distress.  Cardiovascular:      Rate and Rhythm: Normal rate and regular rhythm.   Pulmonary:      Effort: Pulmonary effort is normal.      Breath sounds: Normal breath sounds.   Musculoskeletal:      Cervical back: Neck supple.      Right lower leg: Edema present.      Left lower leg: Edema present.   Neurological:      Mental Status: He is alert.      Motor: Weakness present.   Psychiatric:         Behavior: Behavior is cooperative.         Assessment/Plan  Problem List Items Addressed This Visit       Dementia (Multi)     Mentation at baseline  Monitor for changes   Assist with care as needed         HTN (hypertension)     Monitor BP         Weakness - Primary     Continue in therapy          Medications, treatments, and labs reviewed  Continue medications and treatments as listed in EMR      Scribe Attestation  Beatriz TORRES Scribe   attest that this documentation has been prepared under the direction and in the presence of TAIL Sylvester    Provider Attestation - Scribe documentation  All medical record entries made by the Scribe were at my direction and personally dictated by me. I have reviewed the chart and agree that the record accurately reflects my personal performance of the history, physical exam, discussion and plan.   TALI Sylvester        Electronically Signed By: Arely Streeter  APRN-CNP   5/7/24  3:29 PM

## 2024-04-29 NOTE — PROGRESS NOTES
PROGRESS NOTE    Subjective   Chief complaint: Adalberto Damian is a 64 y.o. male who is an acute skilled patient being seen and evaluated for weakness    HPI:  HPI  Therapy has been working with the patient to improve strength and endurance with ADLs, transfers, and mobility.  Patient continues to work toward goals.  Patient was seen and examined at the bedside, appears to be in no acute distress.  Patient does continue with dark red urine in Rey.  Mentation at baseline.    Objective   Vital signs: 135/78, 83, 18, 98.2, 96%    Physical Exam  Constitutional:       General: He is not in acute distress.  Cardiovascular:      Rate and Rhythm: Normal rate and regular rhythm.   Pulmonary:      Effort: Pulmonary effort is normal.      Breath sounds: Normal breath sounds.   Genitourinary:     Comments: Rey-urine dark red  Musculoskeletal:      Cervical back: Neck supple.      Right lower leg: Edema present.      Left lower leg: Edema present.   Neurological:      Mental Status: He is alert.      Motor: Weakness present.   Psychiatric:         Behavior: Behavior is cooperative.         Assessment/Plan   Problem List Items Addressed This Visit       Dementia (Multi)     Memantine  Mentation at baseline  Monitor for changes   Assist with care as needed         Seizures (Multi)     Depakote  Topiramate  Denies seizure activity  Monitor for seizure activity         Weakness - Primary     Continue working in therapy towards goals         HTN (hypertension)     Monitor BP         Hematuria     Continue to irrigate catheter and hold heparin          Medications, treatments, and labs reviewed  Continue medications and treatments as listed in EMR      Scribe Attestation  Beatriz TORRES Scribe   attest that this documentation has been prepared under the direction and in the presence of TALI Sylvester    Provider Attestation - Scribe documentation  All medical record entries made by the Scribe were at my direction  and personally dictated by me. I have reviewed the chart and agree that the record accurately reflects my personal performance of the history, physical exam, discussion and plan.   Arely Streeter, APRN-CNP

## 2024-04-29 NOTE — PROGRESS NOTES
PROGRESS NOTE    Subjective   Chief complaint: Adalberto Damian is a 64 y.o. male who is an acute skilled patient being seen and evaluated for weakness    HPI:  HPI  Patient presents for f/u therapy and general medical care.  Patient seen and examined at bedside.  Therapy has been working with the patient to improve strength, endurance, ADLs, and transfers d/t generalized weakness.  Patient appears to be in no acute distress.  Nursing is reporting patient was pulling on catheter, now with hematuria.    Objective   Vital signs: 135/78, 83, 18, 98.2, 96%    Physical Exam  Constitutional:       General: He is not in acute distress.  Cardiovascular:      Rate and Rhythm: Normal rate and regular rhythm.   Pulmonary:      Effort: Pulmonary effort is normal.      Breath sounds: Normal breath sounds.   Genitourinary:     Comments: Rey-hematuria  Musculoskeletal:      Cervical back: Neck supple.      Right lower leg: Edema present.      Left lower leg: Edema present.   Neurological:      Mental Status: He is alert.      Motor: Weakness present.   Psychiatric:         Behavior: Behavior is cooperative.         Assessment/Plan   Problem List Items Addressed This Visit       Dementia (Multi)     Memantine  Mentation at baseline  Monitor for changes   Assist with care as needed         Seizures (Multi)     Depakote  Topiramate  Denies seizure activity  Monitor for seizure activity         Weakness - Primary     Work in therapy towards goals         HTN (hypertension)     Monitor BP         Hematuria     Irrigate catheter  Hold anticoagulant  Obtain CBC          Medications, treatments, and labs reviewed  Continue medications and treatments as listed in EMR      Scribe Attestation  Beatriz TORRES Scribe   attest that this documentation has been prepared under the direction and in the presence of TALI Sylvester    Provider Attestation - Scribe documentation  All medical record entries made by the Scribe were at my  direction and personally dictated by me. I have reviewed the chart and agree that the record accurately reflects my personal performance of the history, physical exam, discussion and plan.   Arely Streeter, APRN-CNP

## 2024-04-30 ENCOUNTER — NURSING HOME VISIT (OUTPATIENT)
Dept: POST ACUTE CARE | Facility: EXTERNAL LOCATION | Age: 64
End: 2024-04-30
Payer: MEDICARE

## 2024-04-30 DIAGNOSIS — F03.90 DEMENTIA, UNSPECIFIED DEMENTIA SEVERITY, UNSPECIFIED DEMENTIA TYPE, UNSPECIFIED WHETHER BEHAVIORAL, PSYCHOTIC, OR MOOD DISTURBANCE OR ANXIETY (MULTI): ICD-10-CM

## 2024-04-30 DIAGNOSIS — I10 HYPERTENSION, UNSPECIFIED TYPE: ICD-10-CM

## 2024-04-30 DIAGNOSIS — R53.1 WEAKNESS: ICD-10-CM

## 2024-04-30 DIAGNOSIS — F20.0 PARANOID SCHIZOPHRENIA (MULTI): ICD-10-CM

## 2024-04-30 DIAGNOSIS — R56.9 SEIZURES (MULTI): ICD-10-CM

## 2024-04-30 DIAGNOSIS — K21.9 GASTROESOPHAGEAL REFLUX DISEASE, UNSPECIFIED WHETHER ESOPHAGITIS PRESENT: ICD-10-CM

## 2024-04-30 PROCEDURE — 99308 SBSQ NF CARE LOW MDM 20: CPT | Performed by: INTERNAL MEDICINE

## 2024-04-30 NOTE — PROGRESS NOTES
PROGRESS NOTE    Subjective   Chief complaint: Adalberto Damian is a 64 y.o. male who is an acute skilled patient being seen and evaluated for weakness    HPI:  Patient has been working in therapy to improve strength, endurance, and ADLs.  Patient continues to work toward goals. Following up on elevated WBC - was 15. Awaiting UA results, heparin on hold, labs this week.  No new concerns today.  Denies n/v/f/c pain.        Objective   Vital signs: 131/72,88,97%    Physical Exam  Constitutional:       General: He is not in acute distress.  Cardiovascular:      Rate and Rhythm: Normal rate and regular rhythm.   Pulmonary:      Effort: Pulmonary effort is normal.      Breath sounds: Normal breath sounds.   Musculoskeletal:      Cervical back: Neck supple.      Right lower leg: Edema present.      Left lower leg: Edema present.   Neurological:      Mental Status: He is alert.      Motor: Weakness present.   Psychiatric:         Behavior: Behavior is cooperative.         Assessment/Plan   Problem List Items Addressed This Visit       GERD (gastroesophageal reflux disease)     Omeprazole  Symptoms controlled  Monitor GI symptoms         Paranoid schizophrenia (Multi)     Risperidone  Controlled, monitor for changes          Dementia (Multi)     Memantine  Mentation at baseline  Monitor for changes   Assist with care as needed         Seizures (Multi)     Depakote  Topiramate  Denies seizure activity  Monitor for seizure activity         Weakness     Continue working towards goals in therapy         HTN (hypertension)     Monitor BP          Medications, treatments, and labs reviewed  Continue medications and treatments as listed in EMR    Scribe Attestation  MELISSA, Josefina Schaeffer   attest that this documentation has been prepared under the direction and in the presence of Francisca Moreno MD.     Provider Attestation - Scribe documentation  All medical record entries made by the Scribe were at my direction and personally  dictated by me. I have reviewed the chart and agree that the record accurately reflects my personal performance of the history, physical exam, discussion and plan.   Francisca Moreno MD

## 2024-04-30 NOTE — LETTER
Patient: Adalberto Damian  : 1960    Encounter Date: 2024    PROGRESS NOTE    Subjective  Chief complaint: Adalberto Damian is a 64 y.o. male who is an acute skilled patient being seen and evaluated for weakness    HPI:  Patient has been working in therapy to improve strength, endurance, and ADLs.  Patient continues to work toward goals. Following up on elevated WBC - was 15. Awaiting UA results, heparin on hold, labs this week.  No new concerns today.  Denies n/v/f/c pain.        Objective  Vital signs: 131/72,88,97%    Physical Exam  Constitutional:       General: He is not in acute distress.  Cardiovascular:      Rate and Rhythm: Normal rate and regular rhythm.   Pulmonary:      Effort: Pulmonary effort is normal.      Breath sounds: Normal breath sounds.   Musculoskeletal:      Cervical back: Neck supple.      Right lower leg: Edema present.      Left lower leg: Edema present.   Neurological:      Mental Status: He is alert.      Motor: Weakness present.   Psychiatric:         Behavior: Behavior is cooperative.         Assessment/Plan  Problem List Items Addressed This Visit       GERD (gastroesophageal reflux disease)     Omeprazole  Symptoms controlled  Monitor GI symptoms         Paranoid schizophrenia (Multi)     Risperidone  Controlled, monitor for changes          Dementia (Multi)     Memantine  Mentation at baseline  Monitor for changes   Assist with care as needed         Seizures (Multi)     Depakote  Topiramate  Denies seizure activity  Monitor for seizure activity         Weakness     Continue working towards goals in therapy         HTN (hypertension)     Monitor BP          Medications, treatments, and labs reviewed  Continue medications and treatments as listed in EMR    Scribe Attestation  I, Josefina Schaeffer   attest that this documentation has been prepared under the direction and in the presence of Francisca Moreno MD.     Provider Attestation - Scribe documentation  All medical  record entries made by the Scribe were at my direction and personally dictated by me. I have reviewed the chart and agree that the record accurately reflects my personal performance of the history, physical exam, discussion and plan.   Francisca Moreno MD      Electronically Signed By: Francisca Moreno MD   4/30/24  5:03 PM

## 2024-05-02 ENCOUNTER — NURSING HOME VISIT (OUTPATIENT)
Dept: POST ACUTE CARE | Facility: EXTERNAL LOCATION | Age: 64
End: 2024-05-02
Payer: MEDICARE

## 2024-05-02 DIAGNOSIS — I10 HYPERTENSION, UNSPECIFIED TYPE: ICD-10-CM

## 2024-05-02 DIAGNOSIS — R56.9 SEIZURES (MULTI): ICD-10-CM

## 2024-05-02 DIAGNOSIS — F03.90 DEMENTIA, UNSPECIFIED DEMENTIA SEVERITY, UNSPECIFIED DEMENTIA TYPE, UNSPECIFIED WHETHER BEHAVIORAL, PSYCHOTIC, OR MOOD DISTURBANCE OR ANXIETY (MULTI): ICD-10-CM

## 2024-05-02 DIAGNOSIS — R05.9 COUGH, UNSPECIFIED TYPE: Primary | ICD-10-CM

## 2024-05-02 PROCEDURE — 99308 SBSQ NF CARE LOW MDM 20: CPT | Performed by: NURSE PRACTITIONER

## 2024-05-02 NOTE — LETTER
Patient: Adalberto Damian  : 1960    Encounter Date: 2024    PROGRESS NOTE    Subjective  Chief complaint: Adalberto Damian is a 64 y.o. male who is a long term care patient being seen and evaluated for cough.    HPI:  HPI  Patient was seen due to nurse reporting patient with a dry, nonproductive cough.  Patient denies shortness of breath.  Denies nausea or vomiting.  Denies fever or chills.    Objective  Vital signs: 123/85, 90, 17, 98.0, 95%    Physical Exam  Constitutional:       General: He is not in acute distress.  Cardiovascular:      Rate and Rhythm: Normal rate and regular rhythm.   Pulmonary:      Effort: Pulmonary effort is normal.      Breath sounds: Normal breath sounds.   Genitourinary:     Comments: Rey  Musculoskeletal:      Cervical back: Neck supple.   Neurological:      Mental Status: He is alert.   Psychiatric:         Behavior: Behavior is cooperative.         Assessment/Plan  Problem List Items Addressed This Visit       Dementia (Multi)     Mentation at baseline  Monitor for changes   Assist with care as needed         Seizures (Multi)     Depakote  Topiramate  Denies seizure activity  Monitor for seizure activity         HTN (hypertension)     Monitor BP         Cough - Primary     Robitussin  Monitor          Medications, treatments, and labs reviewed  Continue medications and treatments as listed in EMR    Scribe Attestation  IBeatriz Scribe   attest that this documentation has been prepared under the direction and in the presence of TALI Sylvester    Provider Attestation - Scribe documentation  All medical record entries made by the Scribe were at my direction and personally dictated by me. I have reviewed the chart and agree that the record accurately reflects my personal performance of the history, physical exam, discussion and plan.   TALI Sylvester            Electronically Signed By: TALI Sylvester   5/15/24  3:06 PM

## 2024-05-06 NOTE — PROGRESS NOTES
PROGRESS NOTE    Subjective   Chief complaint: Adalberto Damian is a 64 y.o. male who is an acute skilled patient being seen and evaluated for weakness    HPI:  HPI  Patient has been working in therapy to improve strength, endurance, and ADLs.  Patient continues to work toward goals.  Patient was seen and examined at the bedside, appears to be in no acute distress.  No new concerns today.  Denies n/v/f/c pain.    Objective   Vital signs: 131/72, 18, 88, 97%    Physical Exam  Constitutional:       General: He is not in acute distress.  Cardiovascular:      Rate and Rhythm: Normal rate and regular rhythm.   Pulmonary:      Effort: Pulmonary effort is normal.      Breath sounds: Normal breath sounds.   Musculoskeletal:      Cervical back: Neck supple.      Right lower leg: Edema present.      Left lower leg: Edema present.   Neurological:      Mental Status: He is alert.      Motor: Weakness present.   Psychiatric:         Behavior: Behavior is cooperative.         Assessment/Plan   Problem List Items Addressed This Visit       Dementia (Multi)     Mentation at baseline  Monitor for changes   Assist with care as needed         HTN (hypertension)     Monitor BP         Weakness - Primary     Continue in therapy          Medications, treatments, and labs reviewed  Continue medications and treatments as listed in EMR      Scribe Attestation  Beatriz TORRES Scribe   attest that this documentation has been prepared under the direction and in the presence of TALI Sylvester    Provider Attestation - Scribe documentation  All medical record entries made by the Scribe were at my direction and personally dictated by me. I have reviewed the chart and agree that the record accurately reflects my personal performance of the history, physical exam, discussion and plan.   TALI Sylvester

## 2024-05-13 PROBLEM — R05.9 COUGH: Status: ACTIVE | Noted: 2024-05-13

## 2024-05-13 NOTE — PROGRESS NOTES
PROGRESS NOTE    Subjective   Chief complaint: Adalbreto Damian is a 64 y.o. male who is a long term care patient being seen and evaluated for cough.    HPI:  HPI  Patient was seen due to nurse reporting patient with a dry, nonproductive cough.  Patient denies shortness of breath.  Denies nausea or vomiting.  Denies fever or chills.    Objective   Vital signs: 123/85, 90, 17, 98.0, 95%    Physical Exam  Constitutional:       General: He is not in acute distress.  Cardiovascular:      Rate and Rhythm: Normal rate and regular rhythm.   Pulmonary:      Effort: Pulmonary effort is normal.      Breath sounds: Normal breath sounds.   Genitourinary:     Comments: Rey  Musculoskeletal:      Cervical back: Neck supple.   Neurological:      Mental Status: He is alert.   Psychiatric:         Behavior: Behavior is cooperative.         Assessment/Plan   Problem List Items Addressed This Visit       Dementia (Multi)     Mentation at baseline  Monitor for changes   Assist with care as needed         Seizures (Multi)     Depakote  Topiramate  Denies seizure activity  Monitor for seizure activity         HTN (hypertension)     Monitor BP         Cough - Primary     Robitussin  Monitor          Medications, treatments, and labs reviewed  Continue medications and treatments as listed in EMR    Scribe Attestation  Beatriz TORRES Scribe   attest that this documentation has been prepared under the direction and in the presence of TALI Sylvester    Provider Attestation - Scribe documentation  All medical record entries made by the Scribe were at my direction and personally dictated by me. I have reviewed the chart and agree that the record accurately reflects my personal performance of the history, physical exam, discussion and plan.   TALI Sylvester